# Patient Record
Sex: FEMALE | Race: WHITE | NOT HISPANIC OR LATINO
[De-identification: names, ages, dates, MRNs, and addresses within clinical notes are randomized per-mention and may not be internally consistent; named-entity substitution may affect disease eponyms.]

---

## 2018-12-27 ENCOUNTER — TRANSCRIPTION ENCOUNTER (OUTPATIENT)
Age: 28
End: 2018-12-27

## 2019-01-16 ENCOUNTER — APPOINTMENT (OUTPATIENT)
Dept: OTOLARYNGOLOGY | Facility: CLINIC | Age: 29
End: 2019-01-16
Payer: COMMERCIAL

## 2019-01-16 DIAGNOSIS — J35.01 CHRONIC TONSILLITIS: ICD-10-CM

## 2019-01-16 DIAGNOSIS — R22.1 LOCALIZED SWELLING, MASS AND LUMP, NECK: ICD-10-CM

## 2019-01-16 PROCEDURE — 99204 OFFICE O/P NEW MOD 45 MIN: CPT

## 2019-01-16 RX ORDER — BUTALBITAL, ACETAMINOPHEN, CAFFEINE, AND CODEINE PHOSPHATE 50; 300; 40; 30 MG/1; MG/1; MG/1; MG/1
CAPSULE ORAL
Refills: 0 | Status: ACTIVE | COMMUNITY

## 2019-01-16 RX ORDER — TOPIRAMATE 50 MG/1
TABLET, COATED ORAL
Refills: 0 | Status: ACTIVE | COMMUNITY

## 2019-01-16 NOTE — CONSULT LETTER
[Dear  ___] : Dear  [unfilled], [Consult Letter:] : I had the pleasure of evaluating your patient, [unfilled]. [Please see my note below.] : Please see my note below. [Consult Closing:] : Thank you very much for allowing me to participate in the care of this patient.  If you have any questions, please do not hesitate to contact me. [Sincerely,] : Sincerely, [FreeTextEntry2] : Roberto Gan MD [FreeTextEntry3] : Kristin Heredia MD\par Otolaryngology - Head & Neck Surgery\par

## 2019-01-16 NOTE — HISTORY OF PRESENT ILLNESS
[de-identified] : 28 year old patient is present today for recurrent acute tonsillitis for 10 years. Patient has h/o left tonsillectomy in 2008, when patient had it removed for a peritonsillar abscess. Since that time she has had about 1-2 throat infections over the past 10 years. Most recently, 1 month ago developed sore throat, with exudates on tonsil, went to urgent care told to go to ENT. Low grade fever at that time. Strep negative at that time. She does snore at times.

## 2019-01-16 NOTE — ASSESSMENT
[FreeTextEntry1] : - will proceed with neck us\par - Extensive discussion had with patient regarding role tonsillectomy for recurrent acute tonsillitis. Discussed risks, benefits, alternatives to treatment. She asked questions and these were answered to her apparent satisfaction. We will schedule this at our next mutual convenience. Informed written consent was obtained.\par

## 2019-01-16 NOTE — PHYSICAL EXAM
[de-identified] : right level II mobile rubbery LN, ~1.5cm [Midline] : trachea located in midline position [de-identified] : enlarged right tonsil, no exudates, left tonsil absent [Normal] : no rashes

## 2019-01-17 ENCOUNTER — FORM ENCOUNTER (OUTPATIENT)
Age: 29
End: 2019-01-17

## 2019-01-18 ENCOUNTER — OUTPATIENT (OUTPATIENT)
Dept: OUTPATIENT SERVICES | Facility: HOSPITAL | Age: 29
LOS: 1 days | Discharge: HOME | End: 2019-01-18

## 2019-01-18 DIAGNOSIS — R22.1 LOCALIZED SWELLING, MASS AND LUMP, NECK: ICD-10-CM

## 2019-01-27 ENCOUNTER — INPATIENT (INPATIENT)
Facility: HOSPITAL | Age: 29
LOS: 1 days | Discharge: HOME | End: 2019-01-29
Attending: HOSPITALIST | Admitting: HOSPITALIST
Payer: COMMERCIAL

## 2019-01-27 ENCOUNTER — TRANSCRIPTION ENCOUNTER (OUTPATIENT)
Age: 29
End: 2019-01-27

## 2019-01-27 VITALS
RESPIRATION RATE: 20 BRPM | DIASTOLIC BLOOD PRESSURE: 73 MMHG | HEART RATE: 134 BPM | SYSTOLIC BLOOD PRESSURE: 132 MMHG | TEMPERATURE: 103 F | OXYGEN SATURATION: 97 %

## 2019-01-27 LAB
ANION GAP SERPL CALC-SCNC: 18 MMOL/L — HIGH (ref 7–14)
APPEARANCE UR: CLEAR — SIGNIFICANT CHANGE UP
BASE EXCESS BLDV CALC-SCNC: 0.2 MMOL/L — SIGNIFICANT CHANGE UP (ref -2–2)
BASOPHILS # BLD AUTO: 0.05 K/UL — SIGNIFICANT CHANGE UP (ref 0–0.2)
BASOPHILS NFR BLD AUTO: 0.2 % — SIGNIFICANT CHANGE UP (ref 0–1)
BILIRUB UR-MCNC: NEGATIVE — SIGNIFICANT CHANGE UP
BUN SERPL-MCNC: 10 MG/DL — SIGNIFICANT CHANGE UP (ref 10–20)
CA-I SERPL-SCNC: 1.16 MMOL/L — SIGNIFICANT CHANGE UP (ref 1.12–1.3)
CALCIUM SERPL-MCNC: 9.1 MG/DL — SIGNIFICANT CHANGE UP (ref 8.5–10.1)
CHLORIDE SERPL-SCNC: 95 MMOL/L — LOW (ref 98–110)
CO2 SERPL-SCNC: 20 MMOL/L — SIGNIFICANT CHANGE UP (ref 17–32)
COLOR SPEC: YELLOW — SIGNIFICANT CHANGE UP
CREAT SERPL-MCNC: 1 MG/DL — SIGNIFICANT CHANGE UP (ref 0.7–1.5)
DIFF PNL FLD: ABNORMAL
EOSINOPHIL # BLD AUTO: 0 K/UL — SIGNIFICANT CHANGE UP (ref 0–0.7)
EOSINOPHIL NFR BLD AUTO: 0 % — SIGNIFICANT CHANGE UP (ref 0–8)
EPI CELLS # UR: ABNORMAL /HPF
GAS PNL BLDV: 136 MMOL/L — SIGNIFICANT CHANGE UP (ref 136–145)
GAS PNL BLDV: SIGNIFICANT CHANGE UP
GLUCOSE SERPL-MCNC: 124 MG/DL — HIGH (ref 70–99)
GLUCOSE UR QL: NEGATIVE MG/DL — SIGNIFICANT CHANGE UP
HCG SERPL QL: NEGATIVE — SIGNIFICANT CHANGE UP
HCO3 BLDV-SCNC: 24 MMOL/L — SIGNIFICANT CHANGE UP (ref 22–29)
HCT VFR BLD CALC: 37.9 % — SIGNIFICANT CHANGE UP (ref 37–47)
HCT VFR BLDA CALC: 39.6 % — SIGNIFICANT CHANGE UP (ref 34–44)
HGB BLD CALC-MCNC: 12.9 G/DL — LOW (ref 14–18)
HGB BLD-MCNC: 12.4 G/DL — SIGNIFICANT CHANGE UP (ref 12–16)
IMM GRANULOCYTES NFR BLD AUTO: 1.6 % — HIGH (ref 0.1–0.3)
KETONES UR-MCNC: ABNORMAL
LACTATE BLDV-MCNC: 0.9 MMOL/L — SIGNIFICANT CHANGE UP (ref 0.5–1.6)
LACTATE SERPL-SCNC: 1 MMOL/L — SIGNIFICANT CHANGE UP (ref 0.5–2.2)
LEUKOCYTE ESTERASE UR-ACNC: ABNORMAL
LYMPHOCYTES # BLD AUTO: 13.4 % — LOW (ref 20.5–51.1)
LYMPHOCYTES # BLD AUTO: 2.73 K/UL — SIGNIFICANT CHANGE UP (ref 1.2–3.4)
MCHC RBC-ENTMCNC: 27 PG — SIGNIFICANT CHANGE UP (ref 27–31)
MCHC RBC-ENTMCNC: 32.7 G/DL — SIGNIFICANT CHANGE UP (ref 32–37)
MCV RBC AUTO: 82.4 FL — SIGNIFICANT CHANGE UP (ref 81–99)
MONOCYTES # BLD AUTO: 1.5 K/UL — HIGH (ref 0.1–0.6)
MONOCYTES NFR BLD AUTO: 7.3 % — SIGNIFICANT CHANGE UP (ref 1.7–9.3)
NEUTROPHILS # BLD AUTO: 15.83 K/UL — HIGH (ref 1.4–6.5)
NEUTROPHILS NFR BLD AUTO: 77.5 % — HIGH (ref 42.2–75.2)
NITRITE UR-MCNC: NEGATIVE — SIGNIFICANT CHANGE UP
NRBC # BLD: 0 /100 WBCS — SIGNIFICANT CHANGE UP (ref 0–0)
PCO2 BLDV: 36 MMHG — LOW (ref 41–51)
PH BLDV: 7.43 — SIGNIFICANT CHANGE UP (ref 7.26–7.43)
PH UR: 6.5 — SIGNIFICANT CHANGE UP (ref 5–8)
PLATELET # BLD AUTO: 234 K/UL — SIGNIFICANT CHANGE UP (ref 130–400)
PO2 BLDV: 16 MMHG — LOW (ref 20–40)
POTASSIUM BLDV-SCNC: 3.4 MMOL/L — SIGNIFICANT CHANGE UP (ref 3.3–5.6)
POTASSIUM SERPL-MCNC: 3.3 MMOL/L — LOW (ref 3.5–5)
POTASSIUM SERPL-SCNC: 3.3 MMOL/L — LOW (ref 3.5–5)
PROT UR-MCNC: 30 MG/DL
RBC # BLD: 4.6 M/UL — SIGNIFICANT CHANGE UP (ref 4.2–5.4)
RBC # FLD: 15.1 % — HIGH (ref 11.5–14.5)
RBC CASTS # UR COMP ASSIST: ABNORMAL /HPF
SAO2 % BLDV: 25 % — SIGNIFICANT CHANGE UP
SODIUM SERPL-SCNC: 133 MMOL/L — LOW (ref 135–146)
SP GR SPEC: 1.01 — SIGNIFICANT CHANGE UP (ref 1.01–1.03)
TROPONIN T SERPL-MCNC: <0.01 NG/ML — SIGNIFICANT CHANGE UP
UROBILINOGEN FLD QL: 0.2 MG/DL — SIGNIFICANT CHANGE UP (ref 0.2–0.2)
WBC # BLD: 20.44 K/UL — HIGH (ref 4.8–10.8)
WBC # FLD AUTO: 20.44 K/UL — HIGH (ref 4.8–10.8)
WBC UR QL: ABNORMAL /HPF

## 2019-01-27 RX ORDER — ACETAMINOPHEN 500 MG
975 TABLET ORAL ONCE
Qty: 0 | Refills: 0 | Status: COMPLETED | OUTPATIENT
Start: 2019-01-27 | End: 2019-01-27

## 2019-01-27 RX ORDER — POTASSIUM CHLORIDE 20 MEQ
40 PACKET (EA) ORAL ONCE
Qty: 0 | Refills: 0 | Status: COMPLETED | OUTPATIENT
Start: 2019-01-27 | End: 2019-01-27

## 2019-01-27 RX ORDER — AMPICILLIN SODIUM AND SULBACTAM SODIUM 250; 125 MG/ML; MG/ML
3 INJECTION, POWDER, FOR SUSPENSION INTRAMUSCULAR; INTRAVENOUS ONCE
Qty: 0 | Refills: 0 | Status: COMPLETED | OUTPATIENT
Start: 2019-01-27 | End: 2019-01-27

## 2019-01-27 RX ORDER — SODIUM CHLORIDE 9 MG/ML
2500 INJECTION, SOLUTION INTRAVENOUS
Qty: 0 | Refills: 0 | Status: DISCONTINUED | OUTPATIENT
Start: 2019-01-27 | End: 2019-01-27

## 2019-01-27 RX ORDER — SODIUM CHLORIDE 9 MG/ML
500 INJECTION, SOLUTION INTRAVENOUS
Qty: 0 | Refills: 0 | Status: DISCONTINUED | OUTPATIENT
Start: 2019-01-27 | End: 2019-01-27

## 2019-01-27 RX ORDER — KETOROLAC TROMETHAMINE 30 MG/ML
30 SYRINGE (ML) INJECTION ONCE
Qty: 0 | Refills: 0 | Status: DISCONTINUED | OUTPATIENT
Start: 2019-01-27 | End: 2019-01-27

## 2019-01-27 RX ORDER — POTASSIUM CHLORIDE 20 MEQ
20 PACKET (EA) ORAL ONCE
Qty: 0 | Refills: 0 | Status: COMPLETED | OUTPATIENT
Start: 2019-01-27 | End: 2019-01-27

## 2019-01-27 RX ORDER — SODIUM CHLORIDE 9 MG/ML
2000 INJECTION, SOLUTION INTRAVENOUS
Qty: 0 | Refills: 0 | Status: DISCONTINUED | OUTPATIENT
Start: 2019-01-27 | End: 2019-01-27

## 2019-01-27 RX ORDER — DEXAMETHASONE 0.5 MG/5ML
10 ELIXIR ORAL ONCE
Qty: 0 | Refills: 0 | Status: COMPLETED | OUTPATIENT
Start: 2019-01-27 | End: 2019-01-27

## 2019-01-27 RX ORDER — SODIUM CHLORIDE 9 MG/ML
2500 INJECTION, SOLUTION INTRAVENOUS ONCE
Qty: 0 | Refills: 0 | Status: COMPLETED | OUTPATIENT
Start: 2019-01-27 | End: 2019-01-27

## 2019-01-27 RX ADMIN — SODIUM CHLORIDE 625 MILLILITER(S): 9 INJECTION, SOLUTION INTRAVENOUS at 18:10

## 2019-01-27 RX ADMIN — Medication 20 MILLIEQUIVALENT(S): at 19:06

## 2019-01-27 RX ADMIN — Medication 30 MILLIGRAM(S): at 18:13

## 2019-01-27 RX ADMIN — Medication 975 MILLIGRAM(S): at 19:30

## 2019-01-27 RX ADMIN — AMPICILLIN SODIUM AND SULBACTAM SODIUM 200 GRAM(S): 250; 125 INJECTION, POWDER, FOR SUSPENSION INTRAMUSCULAR; INTRAVENOUS at 19:50

## 2019-01-27 RX ADMIN — Medication 40 MILLIEQUIVALENT(S): at 21:01

## 2019-01-27 RX ADMIN — SODIUM CHLORIDE 1000 MILLILITER(S): 9 INJECTION, SOLUTION INTRAVENOUS at 18:14

## 2019-01-27 RX ADMIN — Medication 10 MILLIGRAM(S): at 18:13

## 2019-01-27 RX ADMIN — SODIUM CHLORIDE 1000 MILLILITER(S): 9 INJECTION, SOLUTION INTRAVENOUS at 18:09

## 2019-01-27 NOTE — ED PROVIDER NOTE - OBJECTIVE STATEMENT
Pt is a 29y/o female with a pmhx of tonsilitis s/p removal of left tonsil 10 years ago presents today for right tonsillar enlargement/sore throat x 3 days with associated fever, chills. Pt has scheduled tonsillectomy with Dr. Heredia on 2/12/19. Pt denies abd pain, CP, SOB, Abd pain, n/v/d.

## 2019-01-27 NOTE — ED PROVIDER NOTE - ATTENDING CONTRIBUTION TO CARE
28 y F PMH prior left tonsillectomy s/p abscess, pw right tonsillar enlargement, pain, fevers, and rapid heartbeat, with voice change. Dr. Heredia is ENT surgeon who planned to perform tonsillectomy in Feb. No n, v, d, SOB, dysuria, hematuria.   Exam: NAD, NCAT, HEENT: right tonsillar and peritonsillar enlargement, deviation of uvula, and narrowing upper airway to 2cm across. tolerating secretions, mmm, no other intraoral structure swelling. EOMI, PERRLA, Neck: right sub-mandible angle tendernes. otherwise ssupple, nontender, nl ROM, meningismus, Heart: tachycardic, RR, no murmur, Lungs: BCTA, no signs of increased WOB, Abd: NTND, no guarding or rebound, no hernia palpated, no CVAT. MSK: chest, back, and ext nontender, nl rom, no deformity. Neuro: A&Ox3, CN II-XII intact, normal strength 5/5 all 4 ext, nl sensation throughout, normal speech, gait, and coordination.   A/P: concern for pharyngitis vs peritonsillar or other upper airway abscess. Contact ENT. CT scan. Tx abx. Reassess.

## 2019-01-27 NOTE — ED PROVIDER NOTE - NS ED ROS FT
Eyes:  No visual changes, eye pain or discharge.  ENMT:  + sore throat, No hearing changes  Cardiac:  No chest pain, SOB or edema. No chest pain with exertion.  Respiratory:  No cough or respiratory distress. No hemoptysis. No history of asthma or RAD.  GI:  No nausea, vomiting, diarrhea or abdominal pain.  :  No dysuria, frequency or burning.  MS:  No myalgia, muscle weakness, joint pain or back pain.  Neuro:  No headache or weakness.  No LOC.  Skin:  No skin rash.   Endocrine: No history of thyroid disease or diabetes.  Except as documented in the HPI,  all other systems are negative.

## 2019-01-27 NOTE — ED ADULT NURSE NOTE - OBJECTIVE STATEMENT
Pt c/o right throat pain, supposed to get tonsil removed in February but having reoccurring pain and fevers.

## 2019-01-27 NOTE — ED PROVIDER NOTE - MEDICAL DECISION MAKING DETAILS
28 female here for evaluation of tonsillitis, known to Dr. Heredia for prior similar. Had labs imaging and ENT consult, plan is for admission for IV antibiotics and treatment of sepsis.  Will admit to med/surg unit.

## 2019-01-27 NOTE — ED PROVIDER NOTE - PROGRESS NOTE DETAILS
pt meets sepsis criteria, code sepsis criteria met, triage nurse made aware, appropriate fluid bolus ordered (2500cc LR), Cultures/labs/VBG Drawn, source seems to be tonsillitis vs PTA, will obtain CT iV contrast neck, muffled voice but mainting airway and in no distress ENT Notified ENT atlas recommends admission, discussed with Pt. will admit to dr. chapa's service. ENT atlas recommends admission, discussed with Pt. discussed with Dr. Jaramillo, accepts pt.

## 2019-01-27 NOTE — ED PROVIDER NOTE - PHYSICAL EXAMINATION
VITAL SIGNS: I have reviewed nursing notes and confirm.  CONSTITUTIONAL: Well-developed; well-nourished; in no acute distress.   SKIN: skin exam is warm and dry, no acute rash.    HEAD: Normocephalic; atraumatic.  EYES: conjunctiva and sclera clear.  ENT: uvula deviated to left, enlarged left tonsil vs PTA, Airway patent, no sublingual edema, No nasal discharge; airway clear.  NECK: Supple; non tender.  CARD: S1, S2 normal; no murmurs, gallops, or rubs. Regular rate and rhythm.   RESP: No wheezes, rales or rhonchi.  ABD: Normal bowel sounds; soft; non-distended; non-tender;  EXT: Normal ROM.  No clubbing, cyanosis or edema.   LYMPH: anterior tender cervical lymph adenopathy  NEURO: Alert, oriented, grossly unremarkable

## 2019-01-27 NOTE — ED ADULT NURSE NOTE - NSIMPLEMENTINTERV_GEN_ALL_ED
Implemented All Universal Safety Interventions:  [place] to call system. Call bell, personal items and telephone within reach. Instruct patient to call for assistance. Room bathroom lighting operational. Non-slip footwear when patient is off stretcher. Physically safe environment: no spills, clutter or unnecessary equipment. Stretcher in lowest position, wheels locked, appropriate side rails in place.

## 2019-01-28 DIAGNOSIS — J03.90 ACUTE TONSILLITIS, UNSPECIFIED: ICD-10-CM

## 2019-01-28 LAB
ANION GAP SERPL CALC-SCNC: 14 MMOL/L — SIGNIFICANT CHANGE UP (ref 7–14)
BASOPHILS # BLD AUTO: 0.03 K/UL — SIGNIFICANT CHANGE UP (ref 0–0.2)
BASOPHILS NFR BLD AUTO: 0.1 % — SIGNIFICANT CHANGE UP (ref 0–1)
BUN SERPL-MCNC: 11 MG/DL — SIGNIFICANT CHANGE UP (ref 10–20)
CALCIUM SERPL-MCNC: 8.7 MG/DL — SIGNIFICANT CHANGE UP (ref 8.5–10.1)
CHLORIDE SERPL-SCNC: 101 MMOL/L — SIGNIFICANT CHANGE UP (ref 98–110)
CO2 SERPL-SCNC: 22 MMOL/L — SIGNIFICANT CHANGE UP (ref 17–32)
CREAT SERPL-MCNC: 0.8 MG/DL — SIGNIFICANT CHANGE UP (ref 0.7–1.5)
CULTURE RESULTS: NO GROWTH — SIGNIFICANT CHANGE UP
EOSINOPHIL # BLD AUTO: 0 K/UL — SIGNIFICANT CHANGE UP (ref 0–0.7)
EOSINOPHIL NFR BLD AUTO: 0 % — SIGNIFICANT CHANGE UP (ref 0–8)
GLUCOSE SERPL-MCNC: 127 MG/DL — HIGH (ref 70–99)
HCT VFR BLD CALC: 33.6 % — LOW (ref 37–47)
HGB BLD-MCNC: 10.6 G/DL — LOW (ref 12–16)
IMM GRANULOCYTES NFR BLD AUTO: 0.9 % — HIGH (ref 0.1–0.3)
LYMPHOCYTES # BLD AUTO: 2.07 K/UL — SIGNIFICANT CHANGE UP (ref 1.2–3.4)
LYMPHOCYTES # BLD AUTO: 9.7 % — LOW (ref 20.5–51.1)
MAGNESIUM SERPL-MCNC: 1.9 MG/DL — SIGNIFICANT CHANGE UP (ref 1.8–2.4)
MCHC RBC-ENTMCNC: 26.5 PG — LOW (ref 27–31)
MCHC RBC-ENTMCNC: 31.5 G/DL — LOW (ref 32–37)
MCV RBC AUTO: 84 FL — SIGNIFICANT CHANGE UP (ref 81–99)
MONOCYTES # BLD AUTO: 2.14 K/UL — HIGH (ref 0.1–0.6)
MONOCYTES NFR BLD AUTO: 10.1 % — HIGH (ref 1.7–9.3)
NEUTROPHILS # BLD AUTO: 16.82 K/UL — HIGH (ref 1.4–6.5)
NEUTROPHILS NFR BLD AUTO: 79.2 % — HIGH (ref 42.2–75.2)
NRBC # BLD: 0 /100 WBCS — SIGNIFICANT CHANGE UP (ref 0–0)
PHOSPHATE SERPL-MCNC: 2 MG/DL — LOW (ref 2.1–4.9)
PLATELET # BLD AUTO: 202 K/UL — SIGNIFICANT CHANGE UP (ref 130–400)
POTASSIUM SERPL-MCNC: 4.6 MMOL/L — SIGNIFICANT CHANGE UP (ref 3.5–5)
POTASSIUM SERPL-SCNC: 4.6 MMOL/L — SIGNIFICANT CHANGE UP (ref 3.5–5)
RBC # BLD: 4 M/UL — LOW (ref 4.2–5.4)
RBC # FLD: 15.2 % — HIGH (ref 11.5–14.5)
SODIUM SERPL-SCNC: 137 MMOL/L — SIGNIFICANT CHANGE UP (ref 135–146)
SPECIMEN SOURCE: SIGNIFICANT CHANGE UP
WBC # BLD: 21.26 K/UL — HIGH (ref 4.8–10.8)
WBC # FLD AUTO: 21.26 K/UL — HIGH (ref 4.8–10.8)

## 2019-01-28 PROCEDURE — 99283 EMERGENCY DEPT VISIT LOW MDM: CPT

## 2019-01-28 RX ORDER — ENOXAPARIN SODIUM 100 MG/ML
40 INJECTION SUBCUTANEOUS DAILY
Qty: 0 | Refills: 0 | Status: DISCONTINUED | OUTPATIENT
Start: 2019-01-28 | End: 2019-01-29

## 2019-01-28 RX ORDER — ACETAMINOPHEN 500 MG
650 TABLET ORAL EVERY 6 HOURS
Qty: 0 | Refills: 0 | Status: DISCONTINUED | OUTPATIENT
Start: 2019-01-28 | End: 2019-01-29

## 2019-01-28 RX ORDER — SODIUM,POTASSIUM PHOSPHATES 278-250MG
1 POWDER IN PACKET (EA) ORAL
Qty: 0 | Refills: 0 | Status: DISCONTINUED | OUTPATIENT
Start: 2019-01-28 | End: 2019-01-29

## 2019-01-28 RX ORDER — DEXAMETHASONE 0.5 MG/5ML
4 ELIXIR ORAL EVERY 6 HOURS
Qty: 0 | Refills: 0 | Status: DISCONTINUED | OUTPATIENT
Start: 2019-01-28 | End: 2019-01-29

## 2019-01-28 RX ORDER — SODIUM,POTASSIUM PHOSPHATES 278-250MG
1 POWDER IN PACKET (EA) ORAL
Qty: 0 | Refills: 0 | Status: DISCONTINUED | OUTPATIENT
Start: 2019-01-28 | End: 2019-01-28

## 2019-01-28 RX ORDER — DEXAMETHASONE 0.5 MG/5ML
6 ELIXIR ORAL DAILY
Qty: 0 | Refills: 0 | Status: DISCONTINUED | OUTPATIENT
Start: 2019-01-28 | End: 2019-01-28

## 2019-01-28 RX ADMIN — Medication 100 MILLIGRAM(S): at 06:01

## 2019-01-28 RX ADMIN — Medication 4 MILLIGRAM(S): at 11:11

## 2019-01-28 RX ADMIN — Medication 100 MILLIGRAM(S): at 17:14

## 2019-01-28 RX ADMIN — Medication 100 MILLIGRAM(S): at 00:24

## 2019-01-28 RX ADMIN — Medication 4 MILLIGRAM(S): at 17:14

## 2019-01-28 RX ADMIN — Medication 6 MILLIGRAM(S): at 06:01

## 2019-01-28 NOTE — PROGRESS NOTE ADULT - ASSESSMENT
28 y.o female with right tonsillitis- clinically improving    ·	increased leukocytosis likely secondary to steroid use  ·	continue IV abx, consider PO trial of clinda prior to D/C  ·	can give one more dose of decadron and then D/C  ·	pain control  ·	soft/liquid diet as tolerated  ·	w/d with attng, will follow

## 2019-01-28 NOTE — CONSULT NOTE ADULT - SUBJECTIVE AND OBJECTIVE BOX
27 y/o female with no pmh presents to the ER for evaluation of fever recorded at home of 105 and throat pain and swelling.  The patient explains that 3 days ago she began to have some throat swelling and pain.  She has had this occur before in the past and had her left tonsil removed due to a similar presentation when she was 18. Patient reports that at home she was taking motrin and it was helping with the pain however the fever would remain elevated despite treatment.  She had an appointment to see  on 2/12 to have her other tonsil removed.  She reports that the reason she only had one tonsil removed is that she was told it would be beneficial to leave it in.  No reports of chills, no sob, no abd pain, no chest pain, no dizziness, no lightheadedness reported by the patient .  No sick contacts, however patient is an employee at the [employer]. She has had dysphagia over the last few days and describeds decreased oral intake 2/2 pain.   To note patient received augmentin for this current episode but did not take the medication as prescribed because she came to the ED before starting treatment.        PAST MEDICAL & SURGICAL HISTORY:  Left sided tonsillectomy  Recurrent tonsillitis      Allergies    No Known Allergies      Social History: ????    ROS:, GI, , CV, Pulm, Neuro, Psych, MS, Heme, Endo, Constitional; all negative except as noted in HPI    Vital Signs Last 24 Hrs  T(C): 36.7 (27 Jan 2019 22:32), Max: 39.5 (27 Jan 2019 17:30)  T(F): 98 (27 Jan 2019 22:32), Max: 103.1 (27 Jan 2019 17:30)  HR: 84 (27 Jan 2019 22:32) (84 - 134)  BP: 114/59 (27 Jan 2019 22:32) (114/59 - 132/73)  BP(mean): --  RR: 18 (27 Jan 2019 22:32) (18 - 20)  SpO2: 98% (27 Jan 2019 22:32) (97% - 98%)                          12.4   20.44 )-----------( 234      ( 27 Jan 2019 18:00 )             37.9    01-27    133<L>  |  95<L>  |  10  ----------------------------<  124<H>  3.3<L>   |  20  |  1.0    Ca    9.1      27 Jan 2019 18:00         PHYSICAL EXAM:  Gen: NAD, well-developed  Head: Normocephalic, Atraumatic  Face: no edema/erythema/fluctuance, parotid glands soft without mass  Eyes: PERRL, EOMI, no scleral injection  Ears: Right - ear canal clear, TM intact without effusion            Left - ear canal clear, TM intact without effusion  Nose: Nares bilaterally patent, no discharge  Mouth: Mucosa moist, Right tonsillar enlargement with exudates, Left tonsillar fossa empty  Neck: Flat, supple, + right submandibular lymphadenopathy, trachea midline, no masses  Resp: breathing easily, no stridor  CV: no peripheral edema/cyanosis    < from: CT Neck Soft Tissue w/ IV Cont (01.27.19 @ 20:56) >  Impression:    1.  Marked enlargement of the right tonsil, obliterating the   glossopharyngeal sulcus. Patent but narrowed airway (about 1 cm in width)     2.  Nonspecific appearance, but given the clinical history, the findings   likely reflect tonsillitis    3.  No abscess or fluid collection    4.  Reactive right level 2 upper cervical lymphadenopathy      < end of copied text >

## 2019-01-28 NOTE — H&P ADULT - ASSESSMENT
29 y/o female with no pmh presents to the ER for evaluation of fever recorded at home of 105 and throat pain and swelling.  The patient explains that 3 days ago she began to have some throat swelling and pain.    # Fevers, throat pain and swelling 2/2 recurrent acute bacterial tonsilitis   - wbc elevated @ [id number]  - f/u blood cultures   - 27 y/o female with no pmh presents to the ER for evaluation of fever recorded at home of 105 and throat pain and swelling.  The patient explains that 3 days ago she began to have some throat swelling and pain.    # Fevers, throat pain and swelling 2/2 recurrent acute bacterial tonsilitis   - wbc elevated @ [id number]  - CT neck shows Marked enlargement of the right tonsil, obliterating the glossopharyngeal sulcus. Patent but narrowed airway (about 1 cm in width)   - f/u blood cultures   - will c/w clindamycin 600mg IV q8hr  - pt received decadron 10mg IV, does not appear to be in respiratory distress, will c/w decadron 6mg daily  - as per nocturinist  no intensive care needed currently as patient is stable with no distress as mentioned  - tylenol for fevers  - ENT evaluation placed     Activity: ambulate as tolerated  diet: regular  gi ppx: not indicated  dvt ppx: lovenox 40mg daily  full code  dispo: from home

## 2019-01-28 NOTE — CONSULT NOTE ADULT - PROBLEM SELECTOR RECOMMENDATION 9
Decadron 10 mg x 1 then 4 mg q 6  IV hydration  Toradol for pain  Throat cultures/ID evaluation  Empiric Abx such as Unasyn  Will f/u in AM

## 2019-01-28 NOTE — CONSULT NOTE ADULT - ATTENDING COMMENTS
Treat with 1 week abx. Discussed with patient that her surgery may need to be delayed, currently scheduled for 2/12. Pre-op testing scheduled for tomorrow. May need to reschedule if still in ED/inpatient tomorrow   - ok for d/c from ENT standpoint as long as tolerating PO, able to maintain hydration and nutrition

## 2019-01-28 NOTE — PROGRESS NOTE ADULT - SUBJECTIVE AND OBJECTIVE BOX
Pt is a 28 y.o female admitted with right tonsillitis - pt seen and examined at bedside. PT states she is doing well, able to tolerate liquids and soft PO (pudding/soup), not regular PO yet. Pt still c/o throat pain, no significant improvement from yesterday. Pt denies any SOB/diff breathing.    Vital Signs: T(F): 96.9 (28 Jan 2019 07:36), Max: 103.1 (27 Jan 2019 17:30), HR: 70, BP: 101/63, RR: 18, SpO2: 100%  GEN: NAD, awake and alert. lying in bed comfortably, no drooling or pooling of secretions.  HEENT: NC/AT; oral cavity no erythema/edema, uvula midline, 3+ right tonsillar edema, no exudates noted. absent left tonsil. no TTP over peritonsillar space, no obvious signs of abscess. no FOMT/edema. neck supple, localized TTP over R lateral neck. FROM of neck.    MEDICATIONS (STANDING):  clindamycin IVPB 600 milliGRAM(s) IV Intermittent every 12 hours  dexamethasone Tablet 4 milliGRAM(s) Oral every 6 hours  enoxaparin Injectable 40 milliGRAM(s) SubCutaneous daily    LABS:                 10.6   21.26 )-----------( 202                 33.6     EXAM:  CT NECK SOFT TISSUE IC          PROCEDURE DATE:  01/27/2019    INTERPRETATION:  Clinical History / Reason for exam: 28-year-old female   complaining of sore throat. Past medical history of tonsillitis and   removal of left tonsil 10 years ago now with right tonsillar enlargement   and throat pain for 3 days with fever and chills  Comparison studies: None    Findings:    Upper aerodigestive tract: There is an anterior foreign body in the oral   tongue consistent with a tongue piercing. There is marked enlargement of   the right tonsil which compresses the airway and causes soft tissue of   the glossopharyngeal sulcus. The airway remains patent, measuring about 1   cm at its narrowest width. There is no fluid collection    The supraglottic, glottic and subglottic portions of the larynx are   normal.  Submandibular and parotid glands are normal. Parotid tissue extends   anteriorly along the external margins of the masseter muscles   bilaterally, normal variant. There is probably a small lipoma of the left   buccal fat pad. The thyroid gland is normal.  Lymph nodes: Level 2 lymph nodes on the right are asymmetrically   prominent, measuring up to 1.5 cm and rounded. These are likely reactive.  Vessels: The carotid arteries, carotid bifurcations and vertebral   arteries in the neck are normal. The intracranial vessels are normally   patent.  Bones: There is reversal of the normal cervical curvature. The bones are   normally mineralized.  Brain: The visualized brain parenchyma included on the study is   unremarkable.  Thorax: The upper lung fields and mediastinum are unremarkable.    Impression:    1.  Marked enlargement of the right tonsil, obliterating the   glossopharyngeal sulcus. Patent but narrowed airway (about 1 cm in width)     2.  Nonspecific appearance, but given the clinical history, the findings   likely reflect tonsillitis    3.  No abscess or fluid collection    4.  Reactive right level 2 upper cervical lymphadenopathy    ROSI HENSLEY M.D., ATTENDING RADIOLOGIST  This document has been electronically signed. Jan 27 2019  9:44PM

## 2019-01-28 NOTE — H&P ADULT - MOUTH
She was told the doctor has to request the results of her labs. She had them done at [facility] in [place].    normal mouth and gums

## 2019-01-28 NOTE — H&P ADULT - ATTENDING COMMENTS
29 y/o female with no pmh presents to the ER for evaluation of fever recorded at home of 105 and throat pain and swelling.  The patient explains that 3 days ago she began to have some throat swelling and pain.  She has had this occur before in the past and had her left tonsil removed due to a similar presentation when she was 18. Patient reports that at home she was taking motrin and it was helping with the pain however the fever would remain elevated despite treatment.  She had an appointment to see  on 2/12 to have her other tonsil removed.  She reports that the reason she only had one tonsil removed is that she was told it would be beneficial to leave it in.  No reports of chills, no sob, no abd pain, no chest pain, no dizziness, no lightheadedness reported by the patient .  No sick contacts, however patient is an employee at the [employer]. She has had dysphagia over the last few days and describeds decreased oral intake 2/2 pain.   To note patient received augmentin for this current episode but did not take the medication as prescribed because she came to the ED before starting treatment.    REVIEW OF SYSTEMS:    CONSTITUTIONAL: No weakness, fevers or chills  EYES/ENT: No visual changes;  No vertigo or throat pain   NECK: No pain or stiffness  RESPIRATORY: No cough, wheezing, hemoptysis; No shortness of breath  CARDIOVASCULAR: No chest pain or palpitations  GASTROINTESTINAL: No abdominal or epigastric pain. No nausea, vomiting, or hematemesis; No diarrhea or constipation. No melena or hematochezia.  GENITOURINARY: No dysuria, frequency or hematuria  NEUROLOGICAL: No numbness or weakness  SKIN: No itching, rashes    Physical Exam:    General: WN/WD NAD  Neurology: A&Ox3, nonfocal, follows commands  Eyes: PERRLA/ EOMI  ENT/Neck: Neck supple, trachea midline, No JVD  Respiratory: CTA B/L, No wheezing, rales, rhonchi  CV: Normal rate regular rhythm, S1S2, no murmurs, rubs or gallops  Abdominal: Soft, NT, ND +BS,   Extremities: No edema, + peripheral pulses  Skin: No Rashes, Hematoma, Ecchymosis  Incisions:   Tubes: 29 y/o female with no pmh presents to the ER for evaluation of fever recorded at home of 105 and throat pain and swelling.  The patient explains that 3 days ago she began to have some throat swelling and pain.  She has had this occur before in the past and had her left tonsil removed due to a similar presentation when she was 18. Patient reports that at home she was taking motrin and it was helping with the pain however the fever would remain elevated despite treatment.  She had an appointment to see  on 2/12 to have her other tonsil removed.  She reports that the reason she only had one tonsil removed is that she was told it would be beneficial to leave it in.  No reports of chills, no sob, no abd pain, no chest pain, no dizziness, no lightheadedness reported by the patient .  No sick contacts, however patient is an employee at the [employer]. She has had dysphagia over the last few days and describeds decreased oral intake 2/2 pain.   To note patient received augmentin for this current episode but did not take the medication as prescribed because she came to the ED before starting treatment.     REVIEW OF SYSTEMS:  CONSTITUTIONAL: No weakness, fevers or chills  EYES/ENT: No visual changes;  No vertigo or throat pain   NECK: No pain or stiffness  RESPIRATORY: No cough, wheezing, hemoptysis; No shortness of breath  CARDIOVASCULAR: No chest pain or palpitations  GASTROINTESTINAL: No abdominal or epigastric pain. No nausea, vomiting, or hematemesis; No diarrhea or constipation. No melena or hematochezia.  GENITOURINARY: No dysuria, frequency or hematuria  NEUROLOGICAL: No numbness or weakness  SKIN: No itching, rashes    T(C): 36.1 (01-28-19 @ 03:08), Max: 39.5 (01-27-19 @ 17:30)  HR: 64 (01-28-19 @ 03:08) (64 - 134)  BP: 105/58 (01-28-19 @ 03:08) (105/58 - 132/73)  RR: 18 (01-28-19 @ 03:08) (18 - 20)  SpO2: 100% (01-28-19 @ 03:08) (97% - 100%)    Physical Exam:  General: WN/WD NAD  Neurology: A&Ox3, nonfocal, follows commands  Eyes: PERRLA/ EOMI  ENT/Neck: Neck supple, trachea midline, R tonsil enlarged and crossing the midline w/ narrowed passageway,   No JVD, NO stridor, R sided neck swelling   Respiratory: CTA B/L, No wheezing, rales, rhonchi  CV: Normal rate regular rhythm, S1S2, no murmurs, rubs or gallops  Abdominal: Soft, NT, ND +BS,   Extremities: No edema, + peripheral pulses  Skin: No Rashes, Hematoma, Ecchymosis  Incisions: n/a  Tubes:n/a    A/P   Acute tonsilitis /sepsis - w/ narrowed airway but in no resp distress  -IV abx and decadron  -check blood Cx and repeat WBC  -PRN pain and fever control  -ENT eval     DVT prophylaxis

## 2019-01-29 ENCOUNTER — TRANSCRIPTION ENCOUNTER (OUTPATIENT)
Age: 29
End: 2019-01-29

## 2019-01-29 VITALS
HEART RATE: 77 BPM | SYSTOLIC BLOOD PRESSURE: 128 MMHG | TEMPERATURE: 97 F | DIASTOLIC BLOOD PRESSURE: 59 MMHG | RESPIRATION RATE: 19 BRPM

## 2019-01-29 LAB
ALBUMIN SERPL ELPH-MCNC: 3.5 G/DL — SIGNIFICANT CHANGE UP (ref 3.5–5.2)
ALP SERPL-CCNC: 58 U/L — SIGNIFICANT CHANGE UP (ref 30–115)
ALT FLD-CCNC: 13 U/L — SIGNIFICANT CHANGE UP (ref 0–41)
ANION GAP SERPL CALC-SCNC: 15 MMOL/L — HIGH (ref 7–14)
AST SERPL-CCNC: 11 U/L — SIGNIFICANT CHANGE UP (ref 0–41)
BASOPHILS # BLD AUTO: 0.03 K/UL — SIGNIFICANT CHANGE UP (ref 0–0.2)
BASOPHILS NFR BLD AUTO: 0.1 % — SIGNIFICANT CHANGE UP (ref 0–1)
BILIRUB SERPL-MCNC: <0.2 MG/DL — SIGNIFICANT CHANGE UP (ref 0.2–1.2)
BUN SERPL-MCNC: 13 MG/DL — SIGNIFICANT CHANGE UP (ref 10–20)
CALCIUM SERPL-MCNC: 9 MG/DL — SIGNIFICANT CHANGE UP (ref 8.5–10.1)
CHLORIDE SERPL-SCNC: 102 MMOL/L — SIGNIFICANT CHANGE UP (ref 98–110)
CO2 SERPL-SCNC: 23 MMOL/L — SIGNIFICANT CHANGE UP (ref 17–32)
CREAT SERPL-MCNC: 0.8 MG/DL — SIGNIFICANT CHANGE UP (ref 0.7–1.5)
EOSINOPHIL # BLD AUTO: 0 K/UL — SIGNIFICANT CHANGE UP (ref 0–0.7)
EOSINOPHIL NFR BLD AUTO: 0 % — SIGNIFICANT CHANGE UP (ref 0–8)
GLUCOSE SERPL-MCNC: 146 MG/DL — HIGH (ref 70–99)
HCT VFR BLD CALC: 34.1 % — LOW (ref 37–47)
HGB BLD-MCNC: 10.8 G/DL — LOW (ref 12–16)
IMM GRANULOCYTES NFR BLD AUTO: 1.2 % — HIGH (ref 0.1–0.3)
INR BLD: 0.99 RATIO — SIGNIFICANT CHANGE UP (ref 0.65–1.3)
LYMPHOCYTES # BLD AUTO: 2.04 K/UL — SIGNIFICANT CHANGE UP (ref 1.2–3.4)
LYMPHOCYTES # BLD AUTO: 9.4 % — LOW (ref 20.5–51.1)
MAGNESIUM SERPL-MCNC: 2.1 MG/DL — SIGNIFICANT CHANGE UP (ref 1.8–2.4)
MCHC RBC-ENTMCNC: 26.7 PG — LOW (ref 27–31)
MCHC RBC-ENTMCNC: 31.7 G/DL — LOW (ref 32–37)
MCV RBC AUTO: 84.4 FL — SIGNIFICANT CHANGE UP (ref 81–99)
MONOCYTES # BLD AUTO: 0.94 K/UL — HIGH (ref 0.1–0.6)
MONOCYTES NFR BLD AUTO: 4.3 % — SIGNIFICANT CHANGE UP (ref 1.7–9.3)
NEUTROPHILS # BLD AUTO: 18.49 K/UL — HIGH (ref 1.4–6.5)
NEUTROPHILS NFR BLD AUTO: 85 % — HIGH (ref 42.2–75.2)
PLATELET # BLD AUTO: 269 K/UL — SIGNIFICANT CHANGE UP (ref 130–400)
POTASSIUM SERPL-MCNC: 5.4 MMOL/L — HIGH (ref 3.5–5)
POTASSIUM SERPL-SCNC: 5.4 MMOL/L — HIGH (ref 3.5–5)
PROT SERPL-MCNC: 6.6 G/DL — SIGNIFICANT CHANGE UP (ref 6–8)
PROTHROM AB SERPL-ACNC: 11.4 SEC — SIGNIFICANT CHANGE UP (ref 9.95–12.87)
RBC # BLD: 4.04 M/UL — LOW (ref 4.2–5.4)
RBC # FLD: 15.5 % — HIGH (ref 11.5–14.5)
SODIUM SERPL-SCNC: 140 MMOL/L — SIGNIFICANT CHANGE UP (ref 135–146)
WBC # BLD: 21.77 K/UL — HIGH (ref 4.8–10.8)
WBC # FLD AUTO: 21.77 K/UL — HIGH (ref 4.8–10.8)

## 2019-01-29 RX ORDER — ACETAMINOPHEN 500 MG
2 TABLET ORAL
Qty: 40 | Refills: 0
Start: 2019-01-29 | End: 2019-02-02

## 2019-01-29 RX ORDER — ACETAMINOPHEN 500 MG
650 TABLET ORAL EVERY 6 HOURS
Qty: 0 | Refills: 0 | Status: DISCONTINUED | OUTPATIENT
Start: 2019-01-29 | End: 2019-01-29

## 2019-01-29 RX ORDER — LACTOBACILLUS ACIDOPHILUS 100MM CELL
1 CAPSULE ORAL
Qty: 7 | Refills: 0 | OUTPATIENT
Start: 2019-01-29 | End: 2019-02-04

## 2019-01-29 RX ADMIN — Medication 650 MILLIGRAM(S): at 07:24

## 2019-01-29 RX ADMIN — Medication 100 MILLIGRAM(S): at 05:55

## 2019-01-29 RX ADMIN — Medication 4 MILLIGRAM(S): at 05:54

## 2019-01-29 RX ADMIN — Medication 1 PACKET(S): at 07:24

## 2019-01-29 RX ADMIN — Medication 4 MILLIGRAM(S): at 01:01

## 2019-01-29 NOTE — DISCHARGE NOTE ADULT - CARE PROVIDER_API CALL
Kristin Heredia), Otolaryngology  [address] [place] [address]  Phone: [phone]  Fax: [phone]

## 2019-01-29 NOTE — PROGRESS NOTE ADULT - ASSESSMENT
Assessment: 27y/o F with R tonsillitis, clinically improving    - increased leukocytosis likely 2/2 steroid use.  - Continue Abx, switch to PO upon discharge  - d/c Decadron  - advance diet as tolerated  - will d/w attending.

## 2019-01-29 NOTE — DISCHARGE NOTE ADULT - CARE PLAN
Principal Discharge DX:	Tonsillitis  Goal:	complete resolution  Assessment and plan of treatment:	Acute tonsillitis of rt tonsil.  continue with oral medicine  follow up with ENT on monday as outpatient  tylenol for pain.

## 2019-01-29 NOTE — PROGRESS NOTE ADULT - SUBJECTIVE AND OBJECTIVE BOX
SUBJECTIVE:    Patient is a 28y old Female who presents with a chief complaint of Fever and throat swelling (2019 08:12)    Currently admitted to medicine with the primary diagnosis of Tonsillitis     Today is hospital day 2d.   This morning she is resting comfortably in bed and reports no new issues or overnight events.     PAST MEDICAL & SURGICAL HISTORY    ALLERGIES:  No Known Allergies    MEDICATIONS:  STANDING MEDICATIONS  clindamycin IVPB 600 milliGRAM(s) IV Intermittent every 12 hours  dexamethasone     Tablet 4 milliGRAM(s) Oral every 6 hours  enoxaparin Injectable 40 milliGRAM(s) SubCutaneous daily  potassium phosphate / sodium phosphate powder 1 Packet(s) Oral two times a day with meals    PRN MEDICATIONS  acetaminophen   Tablet .. 650 milliGRAM(s) Oral every 6 hours PRN  acetaminophen   Tablet .. 650 milliGRAM(s) Oral every 6 hours PRN    VITALS:   T(F): 96.1  HR: 84  BP: 119/71  RR: 18  SpO2: 100%    LABS:                        10.8   21.77 )-----------( 269      ( 2019 05:54 )             34.1         140  |  102  |  13  ----------------------------<  146<H>  5.4<H>   |  23  |  0.8    Ca    9.0      2019 05:54  Phos  2.0       Mg     2.1         TPro  6.6  /  Alb  3.5  /  TBili  <0.2  /  DBili  x   /  AST  11  /  ALT  13  /  AlkPhos  58      PT/INR - ( 2019 05:54 )   PT: 11.40 sec;   INR: 0.99 ratio           Urinalysis Basic - ( 2019 19:20 )    Color: Yellow / Appearance: Clear / S.015 / pH: x  Gluc: x / Ketone: Trace  / Bili: Negative / Urobili: 0.2 mg/dL   Blood: x / Protein: 30 mg/dL / Nitrite: Negative   Leuk Esterase: Trace / RBC: 3-5 /HPF / WBC 6-10 /HPF   Sq Epi: x / Non Sq Epi: Few /HPF / Bacteria: x    Culture - Urine (collected 2019 19:20)  Source: .Urine Clean Catch (Midstream)  Final Report (2019 22:26):    No growth    Culture - Blood (collected 2019 18:05)  Source: .Blood Blood  Preliminary Report (2019 02:19):    No growth to date.    Culture - Blood (collected 2019 18:05)  Source: .Blood Blood  Preliminary Report (2019 02:19):    No growth to date.      CARDIAC MARKERS ( 2019 18:39 )  x     / <0.01 ng/mL / x     / x     / x        PHYSICAL EXAM:  GEN: No acute distress  LUNGS: Clear to auscultation bilaterally   HEART: Regular  ABD: Soft, non-tender, non-distended.  EXT: NC/NC/NE/2+PP/HADLEY/Skin Intact.   NEURO: AAOX3 SUBJECTIVE:    Patient is a 28y old Female who presents with a chief complaint of Fever and throat swelling (2019 08:12)    Currently admitted to medicine with the primary diagnosis of Tonsillitis     Today is hospital day 2d.   This morning she is resting comfortably in bed and reports no new issues or overnight events.   pain and swallowing improving    PAST MEDICAL & SURGICAL HISTORY    ALLERGIES:  No Known Allergies    MEDICATIONS:  STANDING MEDICATIONS  clindamycin IVPB 600 milliGRAM(s) IV Intermittent every 12 hours  dexamethasone     Tablet 4 milliGRAM(s) Oral every 6 hours  enoxaparin Injectable 40 milliGRAM(s) SubCutaneous daily  potassium phosphate / sodium phosphate powder 1 Packet(s) Oral two times a day with meals    PRN MEDICATIONS  acetaminophen   Tablet .. 650 milliGRAM(s) Oral every 6 hours PRN  acetaminophen   Tablet .. 650 milliGRAM(s) Oral every 6 hours PRN    VITALS:   T(F): 96.1  HR: 84  BP: 119/71  RR: 18  SpO2: 100%    LABS:                        10.8   21.77 )-----------( 269      ( 2019 05:54 )             34.1         140  |  102  |  13  ----------------------------<  146<H>  5.4<H>   |  23  |  0.8    Ca    9.0      2019 05:54  Phos  2.0       Mg     2.1         TPro  6.6  /  Alb  3.5  /  TBili  <0.2  /  DBili  x   /  AST  11  /  ALT  13  /  AlkPhos  58      PT/INR - ( 2019 05:54 )   PT: 11.40 sec;   INR: 0.99 ratio      Urinalysis Basic - ( 2019 19:20 )    Color: Yellow / Appearance: Clear / S.015 / pH: x  Gluc: x / Ketone: Trace  / Bili: Negative / Urobili: 0.2 mg/dL   Blood: x / Protein: 30 mg/dL / Nitrite: Negative   Leuk Esterase: Trace / RBC: 3-5 /HPF / WBC 6-10 /HPF   Sq Epi: x / Non Sq Epi: Few /HPF / Bacteria: x    Culture - Urine (collected 2019 19:20)  Source: .Urine Clean Catch (Midstream)  Final Report (2019 22:26):    No growth    Culture - Blood (collected 2019 18:05)  Source: .Blood Blood  Preliminary Report (2019 02:19):    No growth to date.    Culture - Blood (collected 2019 18:05)  Source: .Blood Blood  Preliminary Report (2019 02:19):    No growth to date.      CARDIAC MARKERS ( 2019 18:39 )  x     / <0.01 ng/mL / x     / x     / x        PHYSICAL EXAM:  GEN: No acute distress, red swollen rt tonsil with pus point.  LUNGS: Clear to auscultation bilaterally   HEART: Regular  ABD: Soft, non-tender, non-distended.  EXT: NC/NC/NE/2+PP/HADLEY/Skin Intact.   NEURO: AAOX3

## 2019-01-29 NOTE — DISCHARGE NOTE ADULT - PATIENT PORTAL LINK FT
You can access the [facility] Patient Portal, offered by [facility], by registering with the following website: [email or web address]

## 2019-01-29 NOTE — PROGRESS NOTE ADULT - ATTENDING COMMENTS
Patient seen and examined independently. I agree with the resident's note, physical exam, and plan except as below.  Vital Signs Last 24 Hrs  T(C): 35.6 (29 Jan 2019 07:27), Max: 36.4 (28 Jan 2019 20:30)  T(F): 96.1 (29 Jan 2019 07:27), Max: 97.6 (28 Jan 2019 23:00)  HR: 84 (29 Jan 2019 07:27) (80 - 95)  BP: 119/71 (29 Jan 2019 07:27) (117/66 - 135/74)  BP(mean): 97 (28 Jan 2019 20:30) (97 - 97)  RR: 18 (29 Jan 2019 07:27) (18 - 18)  SpO2: 100% (28 Jan 2019 20:30) (100% - 100%)  PE  nad  [id number]  ctabl  soft ntnd+bs  no cee  Right tonsillar enlargement , no exudates  no stridor      #sepsis due to acute right tonsilitis (left tonsil removed in teenage years)  on Iv clinda  advance diet - pt has not eaten yet  no ENT intervention at this time - no abscess  outpt surgery for be rescheduled   if tolerated diet can dc home with oral clinda 300mg q8 for 7 days + lacobascillus probiotics  dc dexamethasone  discussed with resident

## 2019-01-29 NOTE — PROGRESS NOTE ADULT - ASSESSMENT
27 y/o female with no pmh presents to the ER for evaluation of fever recorded at home of 105 and throat pain and swelling.  The patient explains that 3 days ago she began to have some throat swelling and pain.    # Fevers, throat pain and swelling 2/2 recurrent acute bacterial tonsilitis   - wbc elevated @ 96610  - CT neck shows Marked enlargement of the right tonsil, obliterating the glossopharyngeal sulcus. Patent but narrowed airway (about 1 cm in width)   - f/u blood cultures   - will c/w clindamycin 600mg IV q8hr  - dc decadron as per ENT  - tylenol for fevers    Activity: ambulate as tolerated  diet: regular  gi ppx: not indicated  dvt ppx: lovenox 40mg daily  full code  dispo: from home 27 y/o female with no pmh presents to the ER for evaluation of fever recorded at home of 105 and throat pain and swelling.  The patient explains that 3 days ago she began to have some throat swelling and pain.    # Fevers, throat pain and swelling 2/2 recurrent acute bacterial tonsilitis   - pain and swallowing better  - ENT on board  - wbc elevated @ 38571  - CT neck shows Marked enlargement of the right tonsil, obliterating the glossopharyngeal sulcus. Patent but narrowed airway (about 1 cm in width)   - blood cultures no growth   - will c/w clindamycin 600mg IV q8hr  - dc decadron as per ENT  - tylenol for fevers    Activity: ambulate as tolerated  diet: regular  gi ppx: not indicated  dvt ppx: lovenox 40mg daily  full code  dispo: from home

## 2019-01-29 NOTE — PROGRESS NOTE ADULT - SUBJECTIVE AND OBJECTIVE BOX
Pt is a 27y/o F admitted with right tonsillitis. Seen and examined at bedside. Pt states that she is feeling much better from yesterday, no acute events overnight. Pt states diet still soft/ liquid, has not advanced due to continued tonsillar pain.     Vital Signs Last 24 Hrs  T(F): 96.1, Max: 97.6 HR: 84 BP: 119/71 RR: 18 SpO2: 100%    Gen: NAD, awake, alert, sitting in bed comfortably. No drooling or pooling of secretions.  HEENT: NC/AT. Oral cavity no erythema or edema, uvula midline, 2+ R tonsillar edema improved from previous examination, no exudates noted, absent L tonsil, FROM of neck.     MEDICATIONS  (STANDING):  clindamycin IVPB 600 milliGRAM(s) IV Intermittent every 12 hours  dexamethasone     Tablet 4 milliGRAM(s) Oral every 6 hours  enoxaparin Injectable 40 milliGRAM(s) SubCutaneous daily  potassium phosphate / sodium phosphate powder 1 Packet(s) Oral two times a day with meals                          10.8   21.77 )-----------( 269                  34.1

## 2019-01-29 NOTE — DISCHARGE NOTE ADULT - PLAN OF CARE
complete resolution Acute tonsillitis of rt tonsil.  continue with oral medicine  follow up with ENT on monday as outpatient  tylenol for pain.

## 2019-01-29 NOTE — DISCHARGE NOTE ADULT - CARE PROVIDERS DIRECT ADDRESSES
[email or web address]

## 2019-01-29 NOTE — DISCHARGE NOTE ADULT - MEDICATION SUMMARY - MEDICATIONS TO TAKE
I will START or STAY ON the medications listed below when I get home from the hospital:    Tylenol 325 mg oral tablet  -- 2 tab(s) by mouth 4 times a day as needed  -- This product contains acetaminophen.  Do not use  with any other product containing acetaminophen to prevent possible liver damage.    -- Indication: For pain    Cleocin HCl 300 mg oral capsule  -- 1 cap(s) by mouth 3 times a day   -- Finish all this medication unless otherwise directed by prescriber.  Medication should be taken with plenty of water.    -- Indication: For Tonsillitis    lactobacillus acidophilus oral capsule  -- 1 cap(s) by mouth once a day   -- Indication: For probiotics

## 2019-01-29 NOTE — DISCHARGE NOTE ADULT - HOSPITAL COURSE
29 y/o female with no pmh presents to the ER for evaluation of fever recorded at home of 105 and throat pain and swelling.  The patient explains that 3 days ago she began to have some throat swelling and pain.    patient was admitted under medicine, started on iv clindamicin and decadron. seen by ENT, recs hold decadron.  patient is improving, will discharge on oral meds.

## 2019-02-01 ENCOUNTER — OTHER (OUTPATIENT)
Age: 29
End: 2019-02-01

## 2019-02-01 DIAGNOSIS — A41.9 SEPSIS, UNSPECIFIED ORGANISM: ICD-10-CM

## 2019-02-01 DIAGNOSIS — J02.9 ACUTE PHARYNGITIS, UNSPECIFIED: ICD-10-CM

## 2019-02-01 DIAGNOSIS — J03.80 ACUTE TONSILLITIS DUE TO OTHER SPECIFIED ORGANISMS: ICD-10-CM

## 2019-02-02 LAB
CULTURE RESULTS: SIGNIFICANT CHANGE UP
CULTURE RESULTS: SIGNIFICANT CHANGE UP
SPECIMEN SOURCE: SIGNIFICANT CHANGE UP
SPECIMEN SOURCE: SIGNIFICANT CHANGE UP

## 2019-02-04 ENCOUNTER — OUTPATIENT (OUTPATIENT)
Dept: OUTPATIENT SERVICES | Facility: HOSPITAL | Age: 29
LOS: 1 days | Discharge: HOME | End: 2019-02-04

## 2019-02-04 VITALS
RESPIRATION RATE: 18 BRPM | HEART RATE: 72 BPM | HEIGHT: 66 IN | OXYGEN SATURATION: 98 % | DIASTOLIC BLOOD PRESSURE: 66 MMHG | SYSTOLIC BLOOD PRESSURE: 122 MMHG | WEIGHT: 176.37 LBS | TEMPERATURE: 98 F

## 2019-02-04 DIAGNOSIS — J35.01 CHRONIC TONSILLITIS: ICD-10-CM

## 2019-02-04 DIAGNOSIS — Z01.818 ENCOUNTER FOR OTHER PREPROCEDURAL EXAMINATION: ICD-10-CM

## 2019-02-04 DIAGNOSIS — Z90.89 ACQUIRED ABSENCE OF OTHER ORGANS: Chronic | ICD-10-CM

## 2019-02-04 LAB
ALBUMIN SERPL ELPH-MCNC: 3.9 G/DL — SIGNIFICANT CHANGE UP (ref 3.5–5.2)
ALP SERPL-CCNC: 48 U/L — SIGNIFICANT CHANGE UP (ref 30–115)
ALT FLD-CCNC: 12 U/L — SIGNIFICANT CHANGE UP (ref 0–41)
ANION GAP SERPL CALC-SCNC: 16 MMOL/L — HIGH (ref 7–14)
APTT BLD: 30.5 SEC — SIGNIFICANT CHANGE UP (ref 27–39.2)
AST SERPL-CCNC: 13 U/L — SIGNIFICANT CHANGE UP (ref 0–41)
BASOPHILS # BLD AUTO: 0.03 K/UL — SIGNIFICANT CHANGE UP (ref 0–0.2)
BASOPHILS NFR BLD AUTO: 0.3 % — SIGNIFICANT CHANGE UP (ref 0–1)
BILIRUB SERPL-MCNC: <0.2 MG/DL — SIGNIFICANT CHANGE UP (ref 0.2–1.2)
BUN SERPL-MCNC: 15 MG/DL — SIGNIFICANT CHANGE UP (ref 10–20)
CALCIUM SERPL-MCNC: 9.1 MG/DL — SIGNIFICANT CHANGE UP (ref 8.5–10.1)
CHLORIDE SERPL-SCNC: 100 MMOL/L — SIGNIFICANT CHANGE UP (ref 98–110)
CO2 SERPL-SCNC: 24 MMOL/L — SIGNIFICANT CHANGE UP (ref 17–32)
CREAT SERPL-MCNC: 0.9 MG/DL — SIGNIFICANT CHANGE UP (ref 0.7–1.5)
EOSINOPHIL # BLD AUTO: 0.06 K/UL — SIGNIFICANT CHANGE UP (ref 0–0.7)
EOSINOPHIL NFR BLD AUTO: 0.5 % — SIGNIFICANT CHANGE UP (ref 0–8)
GLUCOSE SERPL-MCNC: 77 MG/DL — SIGNIFICANT CHANGE UP (ref 70–99)
HCT VFR BLD CALC: 36.5 % — LOW (ref 37–47)
HGB BLD-MCNC: 11.8 G/DL — LOW (ref 12–16)
IMM GRANULOCYTES NFR BLD AUTO: 1.7 % — HIGH (ref 0.1–0.3)
INR BLD: 0.94 RATIO — SIGNIFICANT CHANGE UP (ref 0.65–1.3)
LYMPHOCYTES # BLD AUTO: 2.97 K/UL — SIGNIFICANT CHANGE UP (ref 1.2–3.4)
LYMPHOCYTES # BLD AUTO: 27.2 % — SIGNIFICANT CHANGE UP (ref 20.5–51.1)
MCHC RBC-ENTMCNC: 26.8 PG — LOW (ref 27–31)
MCHC RBC-ENTMCNC: 32.3 G/DL — SIGNIFICANT CHANGE UP (ref 32–37)
MCV RBC AUTO: 83 FL — SIGNIFICANT CHANGE UP (ref 81–99)
MONOCYTES # BLD AUTO: 0.7 K/UL — HIGH (ref 0.1–0.6)
MONOCYTES NFR BLD AUTO: 6.4 % — SIGNIFICANT CHANGE UP (ref 1.7–9.3)
NEUTROPHILS # BLD AUTO: 6.96 K/UL — HIGH (ref 1.4–6.5)
NEUTROPHILS NFR BLD AUTO: 63.9 % — SIGNIFICANT CHANGE UP (ref 42.2–75.2)
NRBC # BLD: 0 /100 WBCS — SIGNIFICANT CHANGE UP (ref 0–0)
PLATELET # BLD AUTO: 433 K/UL — HIGH (ref 130–400)
POTASSIUM SERPL-MCNC: 4.8 MMOL/L — SIGNIFICANT CHANGE UP (ref 3.5–5)
POTASSIUM SERPL-SCNC: 4.8 MMOL/L — SIGNIFICANT CHANGE UP (ref 3.5–5)
PROT SERPL-MCNC: 7.3 G/DL — SIGNIFICANT CHANGE UP (ref 6–8)
PROTHROM AB SERPL-ACNC: 10.8 SEC — SIGNIFICANT CHANGE UP (ref 9.95–12.87)
RBC # BLD: 4.4 M/UL — SIGNIFICANT CHANGE UP (ref 4.2–5.4)
RBC # FLD: 14.6 % — HIGH (ref 11.5–14.5)
SODIUM SERPL-SCNC: 140 MMOL/L — SIGNIFICANT CHANGE UP (ref 135–146)
WBC # BLD: 10.91 K/UL — HIGH (ref 4.8–10.8)
WBC # FLD AUTO: 10.91 K/UL — HIGH (ref 4.8–10.8)

## 2019-02-04 NOTE — H&P PST ADULT - REASON FOR ADMISSION
28 yr old f present to PAST for Right Tonsillectomy under GA on 02/12/2019 at [facility] OR by DR. Heredia.

## 2019-02-04 NOTE — H&P PST ADULT - FAMILY HISTORY
Father  Still living? Yes, Estimated age: Age Unknown  DM (diabetes mellitus), Age at diagnosis: Age Unknown     Mother  Still living? Unknown  DM (diabetes mellitus), Age at diagnosis: Age Unknown

## 2019-02-04 NOTE — H&P PST ADULT - HISTORY OF PRESENT ILLNESS
Patient presents with c/o recurrent throat infection and last infection was on 1/27/2018 and admitted to [facility] with fever of 105. Denies any c/o cp, sob, palpittaions, fever, cough or dysuria. Ex tolerance of 10 fos walk with out sob. Patient presents with c/o recurrent throat infection and last infection was on 1/27/2018 and admitted to [facility] with fever of 105. Denies any c/o cp, sob, palpittaions, fever, cough or dysuria. Ex tolerance of 10 fos walk with out sob. No LUCHO.

## 2019-02-04 NOTE — H&P PST ADULT - NSANTHOSAYNRD_GEN_A_CORE
No. LUCHO screening performed.  STOP BANG Legend: 0-2 = LOW Risk; 3-4 = INTERMEDIATE Risk; 5-8 = HIGH Risk

## 2019-02-08 ENCOUNTER — APPOINTMENT (OUTPATIENT)
Dept: OTOLARYNGOLOGY | Facility: CLINIC | Age: 29
End: 2019-02-08
Payer: COMMERCIAL

## 2019-02-08 DIAGNOSIS — J03.91 ACUTE RECURRENT TONSILLITIS, UNSPECIFIED: ICD-10-CM

## 2019-02-08 PROCEDURE — 99212 OFFICE O/P EST SF 10 MIN: CPT

## 2019-02-08 NOTE — REASON FOR VISIT
[Subsequent Evaluation] : a subsequent evaluation for [FreeTextEntry2] : recurrent acute tonsillitis, f/up

## 2019-02-08 NOTE — ASSESSMENT
[FreeTextEntry1] : - recent tonsillitis infection resolved\par - surgery as scheduled 2/12/19 for tonsillectomy, reviewed again details of surgery, all questions answered

## 2019-02-08 NOTE — HISTORY OF PRESENT ILLNESS
[de-identified] : 28 year old patient is present today for recurrent acute tonsillitis for 10 years. Patient has h/o left tonsillectomy in 2008, when patient had it removed for a peritonsillar abscess. Since that time she has had about 1-2 throat infections over the past 10 years. Most recently, 1 month ago developed sore throat, with exudates on tonsil, went to urgent care told to go to ENT. Low grade fever at that time. Strep negative at that time. She does snore at times.  [FreeTextEntry1] : 2/8/19 Patient is following up for neck mass, and US results. Patient admits recently in the ED 1/27/19 due to another throat infection. She completed course of abx rx'ed in ED. Overall feeling better. No fevers. Went to pre-op testing. Surgery scheduled for 2/12/19.\par

## 2019-02-08 NOTE — PHYSICAL EXAM
[Midline] : trachea located in midline position [de-identified] : enlarged right tonsil, no exudates, left tonsil absent [Normal] : no rashes

## 2019-02-11 ENCOUNTER — OTHER (OUTPATIENT)
Age: 29
End: 2019-02-11

## 2019-02-11 DIAGNOSIS — G89.18 OTHER ACUTE POSTPROCEDURAL PAIN: ICD-10-CM

## 2019-02-11 RX ORDER — OXYCODONE AND ACETAMINOPHEN 5; 325 MG/1; MG/1
5-325 TABLET ORAL
Qty: 15 | Refills: 0 | Status: ACTIVE | COMMUNITY
Start: 2019-02-11 | End: 1900-01-01

## 2019-02-12 ENCOUNTER — OUTPATIENT (OUTPATIENT)
Dept: OUTPATIENT SERVICES | Facility: HOSPITAL | Age: 29
LOS: 1 days | Discharge: HOME | End: 2019-02-12

## 2019-02-12 ENCOUNTER — APPOINTMENT (OUTPATIENT)
Dept: OTOLARYNGOLOGY | Facility: AMBULATORY SURGERY CENTER | Age: 29
End: 2019-02-12
Payer: COMMERCIAL

## 2019-02-12 ENCOUNTER — RESULT REVIEW (OUTPATIENT)
Age: 29
End: 2019-02-12

## 2019-02-12 VITALS
HEART RATE: 66 BPM | WEIGHT: 176.37 LBS | DIASTOLIC BLOOD PRESSURE: 67 MMHG | SYSTOLIC BLOOD PRESSURE: 111 MMHG | HEIGHT: 66 IN | OXYGEN SATURATION: 100 % | TEMPERATURE: 98 F | RESPIRATION RATE: 17 BRPM

## 2019-02-12 VITALS
RESPIRATION RATE: 20 BRPM | TEMPERATURE: 98 F | DIASTOLIC BLOOD PRESSURE: 70 MMHG | OXYGEN SATURATION: 100 % | HEART RATE: 50 BPM | SYSTOLIC BLOOD PRESSURE: 120 MMHG

## 2019-02-12 DIAGNOSIS — Z90.89 ACQUIRED ABSENCE OF OTHER ORGANS: Chronic | ICD-10-CM

## 2019-02-12 PROCEDURE — 42826 REMOVAL OF TONSILS: CPT

## 2019-02-12 RX ORDER — ONDANSETRON 8 MG/1
4 TABLET, FILM COATED ORAL ONCE
Qty: 0 | Refills: 0 | Status: DISCONTINUED | OUTPATIENT
Start: 2019-02-12 | End: 2019-02-27

## 2019-02-12 RX ORDER — MORPHINE SULFATE 50 MG/1
2 CAPSULE, EXTENDED RELEASE ORAL
Qty: 0 | Refills: 0 | Status: DISCONTINUED | OUTPATIENT
Start: 2019-02-12 | End: 2019-02-12

## 2019-02-12 RX ORDER — OXYCODONE AND ACETAMINOPHEN 5; 325 MG/1; MG/1
1 TABLET ORAL EVERY 4 HOURS
Qty: 0 | Refills: 0 | Status: DISCONTINUED | OUTPATIENT
Start: 2019-02-12 | End: 2019-02-12

## 2019-02-12 RX ORDER — SODIUM CHLORIDE 9 MG/ML
1000 INJECTION, SOLUTION INTRAVENOUS
Qty: 0 | Refills: 0 | Status: DISCONTINUED | OUTPATIENT
Start: 2019-02-12 | End: 2019-02-27

## 2019-02-12 RX ADMIN — SODIUM CHLORIDE 100 MILLILITER(S): 9 INJECTION, SOLUTION INTRAVENOUS at 08:23

## 2019-02-12 NOTE — ASU DISCHARGE PLAN (ADULT/PEDIATRIC). - PAIN
prescription called to pharmacy/Can also take Tylenol or ibuprofen, do not exceed 3500mg Tylenol/day

## 2019-02-13 LAB — SURGICAL PATHOLOGY STUDY: SIGNIFICANT CHANGE UP

## 2019-02-14 DIAGNOSIS — J35.01 CHRONIC TONSILLITIS: ICD-10-CM

## 2020-04-26 ENCOUNTER — MESSAGE (OUTPATIENT)
Age: 30
End: 2020-04-26

## 2020-11-04 ENCOUNTER — EMERGENCY (EMERGENCY)
Facility: HOSPITAL | Age: 30
LOS: 0 days | Discharge: HOME | End: 2020-11-04
Attending: EMERGENCY MEDICINE | Admitting: EMERGENCY MEDICINE
Payer: OTHER MISCELLANEOUS

## 2020-11-04 VITALS
OXYGEN SATURATION: 97 % | HEIGHT: 66 IN | SYSTOLIC BLOOD PRESSURE: 128 MMHG | DIASTOLIC BLOOD PRESSURE: 67 MMHG | HEART RATE: 88 BPM | WEIGHT: 190.04 LBS | RESPIRATION RATE: 16 BRPM | TEMPERATURE: 99 F

## 2020-11-04 DIAGNOSIS — S61.231A PUNCTURE WOUND WITHOUT FOREIGN BODY OF LEFT INDEX FINGER WITHOUT DAMAGE TO NAIL, INITIAL ENCOUNTER: ICD-10-CM
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
## 2020-11-04 DIAGNOSIS — Z90.89 ACQUIRED ABSENCE OF OTHER ORGANS: ICD-10-CM

## 2020-11-04 DIAGNOSIS — Z90.89 ACQUIRED ABSENCE OF OTHER ORGANS: Chronic | ICD-10-CM
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
## 2020-11-04 PROCEDURE — 99284 EMERGENCY DEPT VISIT MOD MDM: CPT

## 2020-11-04 NOTE — ED PROVIDER NOTE - ATTENDING CONTRIBUTION TO CARE
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  Pt was administering an IM medication to patient when pt became upset and pulled away.  Pt stuck her left index finger after administering the med.  She cleaned area well,  She is UTD on Tetanus and Hepatitis vaccines.  Pt is [employer] [place].  There is no record of Hepatitis or HIV testing in our system. On exam pt in NAD AAO x 3, + puncture to left index finger, no swelling, no bleeding

## 2020-11-04 NOTE — ED ADULT TRIAGE NOTE - CHIEF COMPLAINT QUOTE
Pt is nurse for [employer]. 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

## 2020-11-04 NOTE — ED PROVIDER NOTE - CLINICAL SUMMARY MEDICAL DECISION MAKING FREE TEXT BOX
Needle stick protocol completed.  Pt unsure if source pt will be cooperative with blood draw.  Discussed with ID.  Pt offered PEP.  Will follow up with PMD. Pt instructed to return if any worsening symptoms or concerns.  They verbalize understanding.

## 2020-11-04 NOTE — ED ADULT NURSE NOTE - CHIEF COMPLAINT QUOTE
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

## 2020-11-04 NOTE — ED PROVIDER NOTE - NS ED ROS FT
Review of Systems:  	•	CONSTITUTIONAL - no fever, no diaphoresis, no chills  	•	SKIN - no rash  	•	HEMATOLOGIC - no bleeding, no bruising    	•	RESPIRATORY - no shortness of breath, no cough  	•	CARDIAC - no chest pain, no palpitations  	  	•	NEUROLOGIC - no weakness, no headache, no paresthesias, no LOC  	•	PSYCH - no anxiety, non suicidal, non homicidal, no hallucination, no depression

## 2020-11-04 NOTE — ED PROVIDER NOTE - OBJECTIVE STATEMENT
this is a 31 yo female presents to ed for evaluation after needle stick today while at work.  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 patient is know to be difficult

## 2020-11-04 NOTE — ED PROVIDER NOTE - PHYSICAL EXAMINATION
--EXAM--  VITAL SIGNS: I have reviewed vs documented at present.  CONSTITUTIONAL: Well-developed; well-nourished; in no acute distress.   SKIN: Warm and dry, no acute rash.       CARD: S1, S2, Regular rate and rhythm.   RESP: No wheezes, rales or rhonchi.    NEURO: Alert, oriented, grossly unremarkable. Strength 5/5 in all extremities. Sensation intact throughout.  PSYCH: Cooperative, appropriate.

## 2020-11-04 NOTE — ED PROVIDER NOTE - PATIENT PORTAL LINK FT
You can access the FollowMyHealth Patient Portal offered by [facility] by registering at the following website: [email or web address]. By joining [employer]’s FollowMyHealth portal, you will also be able to view your health information using other applications (apps) compatible with our system.

## 2020-11-05 PROBLEM — J35.01 CHRONIC TONSILLITIS: Chronic | Status: ACTIVE | Noted: 2019-02-04

## 2020-11-05 PROBLEM — G43.909 MIGRAINE, UNSPECIFIED, NOT INTRACTABLE, WITHOUT STATUS MIGRAINOSUS: Chronic | Status: ACTIVE | Noted: 2019-02-04

## 2021-03-24 ENCOUNTER — OUTPATIENT (OUTPATIENT)
Dept: OUTPATIENT SERVICES | Facility: HOSPITAL | Age: 31
LOS: 1 days | Discharge: HOME | End: 2021-03-24

## 2021-03-24 DIAGNOSIS — Z90.89 ACQUIRED ABSENCE OF OTHER ORGANS: Chronic | ICD-10-CM

## 2021-03-24 DIAGNOSIS — Z00.8 ENCOUNTER FOR OTHER GENERAL EXAMINATION: ICD-10-CM

## 2021-07-20 NOTE — DISCHARGE NOTE ADULT - MEDICATION SUMMARY - MEDICATIONS TO CHANGE
Dermatitis Treatment Plan       When hands and ears are itchy, red and scaly, follow this plan to care for your hands:    Step 1:  At bedtime, soak hands in a clean bowl of lukewarm tap water for 10-15   minutes.  Gently pat dry, but do not rub.  Skin should still be damp.    Step 2:  Apply topical creams immediately after soaking step, while skin is still damp.    Apply Vaseline to any open cracks on the skin.     Apply  hydrocortisone 2.5% ointment to affected areas on the hands.      Apply Vanicream moisturizer moisturizer to all other areas of the hands.    Step 3:  Put on white cotton gloves, or wrap hands lightly using gauze and an ACE bandage.     Step 4: Remove gloves in the morning and apply medication and moisturizer again.      Apply moisturizer immediately after bathing and after washing hands every time.    Use a mild soap when bathing and washing hands Vanicream bar soap or CeraVe bar soap.      Stop using medicated creams when skin is clear, and continue using moisturizer at least twice daily (and after bathing and washing hands) as maintenance treatment.  Use moisturizer every day even if skin appears clear to help reduce flares.     If you are going to wear rubber gloves, use a white cotton glove liner and change it frequently to help reduce moisture on the skin.        I will SWITCH the dose or number of times a day I take the medications listed below when I get home from the hospital:  None

## 2021-11-01 ENCOUNTER — TRANSCRIPTION ENCOUNTER (OUTPATIENT)
Age: 31
End: 2021-11-01

## 2021-11-04 ENCOUNTER — INPATIENT (INPATIENT)
Facility: HOSPITAL | Age: 31
LOS: 0 days | Discharge: HOME | End: 2021-11-05
Attending: OBSTETRICS & GYNECOLOGY | Admitting: OBSTETRICS & GYNECOLOGY
Payer: COMMERCIAL

## 2021-11-04 VITALS
DIASTOLIC BLOOD PRESSURE: 71 MMHG | RESPIRATION RATE: 16 BRPM | HEART RATE: 82 BPM | SYSTOLIC BLOOD PRESSURE: 124 MMHG | TEMPERATURE: 98 F

## 2021-11-04 DIAGNOSIS — Z90.89 ACQUIRED ABSENCE OF OTHER ORGANS: Chronic | ICD-10-CM

## 2021-11-04 LAB
BASOPHILS # BLD AUTO: 0.04 K/UL — SIGNIFICANT CHANGE UP (ref 0–0.2)
BASOPHILS NFR BLD AUTO: 0.2 % — SIGNIFICANT CHANGE UP (ref 0–1)
EOSINOPHIL # BLD AUTO: 0.01 K/UL — SIGNIFICANT CHANGE UP (ref 0–0.7)
EOSINOPHIL NFR BLD AUTO: 0.1 % — SIGNIFICANT CHANGE UP (ref 0–8)
HCT VFR BLD CALC: 37.2 % — SIGNIFICANT CHANGE UP (ref 37–47)
HGB BLD-MCNC: 11.8 G/DL — LOW (ref 12–16)
HIV 1 & 2 AB SERPL IA.RAPID: SIGNIFICANT CHANGE UP
IMM GRANULOCYTES NFR BLD AUTO: 0.8 % — HIGH (ref 0.1–0.3)
LYMPHOCYTES # BLD AUTO: 1.37 K/UL — SIGNIFICANT CHANGE UP (ref 1.2–3.4)
LYMPHOCYTES # BLD AUTO: 8 % — LOW (ref 20.5–51.1)
MCHC RBC-ENTMCNC: 26 PG — LOW (ref 27–31)
MCHC RBC-ENTMCNC: 31.7 G/DL — LOW (ref 32–37)
MCV RBC AUTO: 81.9 FL — SIGNIFICANT CHANGE UP (ref 81–99)
MONOCYTES # BLD AUTO: 0.49 K/UL — SIGNIFICANT CHANGE UP (ref 0.1–0.6)
MONOCYTES NFR BLD AUTO: 2.8 % — SIGNIFICANT CHANGE UP (ref 1.7–9.3)
NEUTROPHILS # BLD AUTO: 15.17 K/UL — HIGH (ref 1.4–6.5)
NEUTROPHILS NFR BLD AUTO: 88.1 % — HIGH (ref 42.2–75.2)
NRBC # BLD: 0 /100 WBCS — SIGNIFICANT CHANGE UP (ref 0–0)
PLATELET # BLD AUTO: 329 K/UL — SIGNIFICANT CHANGE UP (ref 130–400)
PRENATAL SYPHILIS TEST: SIGNIFICANT CHANGE UP
RBC # BLD: 4.54 M/UL — SIGNIFICANT CHANGE UP (ref 4.2–5.4)
RBC # FLD: 15.4 % — HIGH (ref 11.5–14.5)
SARS-COV-2 RNA SPEC QL NAA+PROBE: SIGNIFICANT CHANGE UP
WBC # BLD: 17.21 K/UL — HIGH (ref 4.8–10.8)
WBC # FLD AUTO: 17.21 K/UL — HIGH (ref 4.8–10.8)

## 2021-11-04 RX ORDER — SODIUM CHLORIDE 9 MG/ML
1000 INJECTION, SOLUTION INTRAVENOUS
Refills: 0 | Status: DISCONTINUED | OUTPATIENT
Start: 2021-11-04 | End: 2021-11-04

## 2021-11-04 RX ORDER — NALOXONE HYDROCHLORIDE 4 MG/.1ML
0.1 SPRAY NASAL
Refills: 0 | Status: DISCONTINUED | OUTPATIENT
Start: 2021-11-04 | End: 2021-11-05

## 2021-11-04 RX ORDER — DEXAMETHASONE 0.5 MG/5ML
4 ELIXIR ORAL EVERY 6 HOURS
Refills: 0 | Status: DISCONTINUED | OUTPATIENT
Start: 2021-11-04 | End: 2021-11-05

## 2021-11-04 RX ORDER — MAGNESIUM HYDROXIDE 400 MG/1
30 TABLET, CHEWABLE ORAL
Refills: 0 | Status: DISCONTINUED | OUTPATIENT
Start: 2021-11-04 | End: 2021-11-05

## 2021-11-04 RX ORDER — ONDANSETRON 8 MG/1
4 TABLET, FILM COATED ORAL EVERY 6 HOURS
Refills: 0 | Status: DISCONTINUED | OUTPATIENT
Start: 2021-11-04 | End: 2021-11-05

## 2021-11-04 RX ORDER — DIPHENHYDRAMINE HCL 50 MG
25 CAPSULE ORAL EVERY 6 HOURS
Refills: 0 | Status: DISCONTINUED | OUTPATIENT
Start: 2021-11-04 | End: 2021-11-05

## 2021-11-04 RX ORDER — BENZOCAINE 10 %
1 GEL (GRAM) MUCOUS MEMBRANE EVERY 6 HOURS
Refills: 0 | Status: DISCONTINUED | OUTPATIENT
Start: 2021-11-04 | End: 2021-11-05

## 2021-11-04 RX ORDER — HYDROCORTISONE 1 %
1 OINTMENT (GRAM) TOPICAL EVERY 6 HOURS
Refills: 0 | Status: DISCONTINUED | OUTPATIENT
Start: 2021-11-04 | End: 2021-11-05

## 2021-11-04 RX ORDER — KETOROLAC TROMETHAMINE 30 MG/ML
30 SYRINGE (ML) INJECTION ONCE
Refills: 0 | Status: DISCONTINUED | OUTPATIENT
Start: 2021-11-04 | End: 2021-11-04

## 2021-11-04 RX ORDER — LANOLIN
1 OINTMENT (GRAM) TOPICAL EVERY 6 HOURS
Refills: 0 | Status: DISCONTINUED | OUTPATIENT
Start: 2021-11-04 | End: 2021-11-05

## 2021-11-04 RX ORDER — SIMETHICONE 80 MG/1
80 TABLET, CHEWABLE ORAL EVERY 4 HOURS
Refills: 0 | Status: DISCONTINUED | OUTPATIENT
Start: 2021-11-04 | End: 2021-11-05

## 2021-11-04 RX ORDER — PRAMOXINE HYDROCHLORIDE 150 MG/15G
1 AEROSOL, FOAM RECTAL EVERY 4 HOURS
Refills: 0 | Status: DISCONTINUED | OUTPATIENT
Start: 2021-11-04 | End: 2021-11-05

## 2021-11-04 RX ORDER — DIBUCAINE 1 %
1 OINTMENT (GRAM) RECTAL EVERY 6 HOURS
Refills: 0 | Status: DISCONTINUED | OUTPATIENT
Start: 2021-11-04 | End: 2021-11-05

## 2021-11-04 RX ORDER — ACETAMINOPHEN 500 MG
975 TABLET ORAL
Refills: 0 | Status: DISCONTINUED | OUTPATIENT
Start: 2021-11-04 | End: 2021-11-05

## 2021-11-04 RX ORDER — OXYTOCIN 10 UNIT/ML
333.33 VIAL (ML) INJECTION
Qty: 20 | Refills: 0 | Status: COMPLETED | OUTPATIENT
Start: 2021-11-04 | End: 2021-11-04

## 2021-11-04 RX ORDER — IBUPROFEN 200 MG
600 TABLET ORAL EVERY 6 HOURS
Refills: 0 | Status: DISCONTINUED | OUTPATIENT
Start: 2021-11-04 | End: 2021-11-05

## 2021-11-04 RX ORDER — SODIUM CHLORIDE 9 MG/ML
3 INJECTION INTRAMUSCULAR; INTRAVENOUS; SUBCUTANEOUS EVERY 8 HOURS
Refills: 0 | Status: DISCONTINUED | OUTPATIENT
Start: 2021-11-04 | End: 2021-11-05

## 2021-11-04 RX ORDER — TETANUS TOXOID, REDUCED DIPHTHERIA TOXOID AND ACELLULAR PERTUSSIS VACCINE, ADSORBED 5; 2.5; 8; 8; 2.5 [IU]/.5ML; [IU]/.5ML; UG/.5ML; UG/.5ML; UG/.5ML
0.5 SUSPENSION INTRAMUSCULAR ONCE
Refills: 0 | Status: DISCONTINUED | OUTPATIENT
Start: 2021-11-04 | End: 2021-11-05

## 2021-11-04 RX ORDER — IBUPROFEN 200 MG
600 TABLET ORAL EVERY 6 HOURS
Refills: 0 | Status: COMPLETED | OUTPATIENT
Start: 2021-11-04 | End: 2022-10-03

## 2021-11-04 RX ORDER — OXYCODONE HYDROCHLORIDE 5 MG/1
5 TABLET ORAL ONCE
Refills: 0 | Status: DISCONTINUED | OUTPATIENT
Start: 2021-11-04 | End: 2021-11-05

## 2021-11-04 RX ORDER — AER TRAVELER 0.5 G/1
1 SOLUTION RECTAL; TOPICAL EVERY 4 HOURS
Refills: 0 | Status: DISCONTINUED | OUTPATIENT
Start: 2021-11-04 | End: 2021-11-05

## 2021-11-04 RX ORDER — INFLUENZA VIRUS VACCINE 15; 15; 15; 15 UG/.5ML; UG/.5ML; UG/.5ML; UG/.5ML
0.5 SUSPENSION INTRAMUSCULAR ONCE
Refills: 0 | Status: COMPLETED | OUTPATIENT
Start: 2021-11-04 | End: 2021-11-04

## 2021-11-04 RX ORDER — OXYCODONE HYDROCHLORIDE 5 MG/1
5 TABLET ORAL
Refills: 0 | Status: DISCONTINUED | OUTPATIENT
Start: 2021-11-04 | End: 2021-11-05

## 2021-11-04 RX ADMIN — Medication 1 APPLICATION(S): at 17:29

## 2021-11-04 RX ADMIN — Medication 30 MILLIGRAM(S): at 11:18

## 2021-11-04 RX ADMIN — Medication 600 MILLIGRAM(S): at 19:26

## 2021-11-04 RX ADMIN — Medication 1000 MILLIUNIT(S)/MIN: at 11:18

## 2021-11-04 RX ADMIN — Medication 1 TABLET(S): at 15:41

## 2021-11-04 RX ADMIN — SODIUM CHLORIDE 3 MILLILITER(S): 9 INJECTION INTRAMUSCULAR; INTRAVENOUS; SUBCUTANEOUS at 15:34

## 2021-11-04 RX ADMIN — Medication 30 MILLIGRAM(S): at 10:31

## 2021-11-04 RX ADMIN — SODIUM CHLORIDE 3 MILLILITER(S): 9 INJECTION INTRAMUSCULAR; INTRAVENOUS; SUBCUTANEOUS at 21:46

## 2021-11-04 RX ADMIN — Medication 975 MILLIGRAM(S): at 21:41

## 2021-11-04 RX ADMIN — Medication 600 MILLIGRAM(S): at 19:00

## 2021-11-04 NOTE — OB RN TRIAGE NOTE - CURRENT PREGNANCY COMPLICATIONS, OB PROFILE
[FreeTextEntry1] : Check urinalysis  and urine culture. Start cream. Increase fluids. Hygiene reviewed in regards to the bathroom and  Monitor for worse symptoms of fever, chills, dysuria, hematuria, nausea, vomiting or back pain. Call the office or go the ER if worse.\par try estrace cream for atrophy\par check us bladder sonogram ro pvr\par We spent sufficient time to discuss aspects of care; questions were answered  to patient's satisfaction.The diagnosis and care plan were discussed with patient in detail.  Patient test results were  reviewed and explained in full. All questions and concerns  were answered to the best of my knowledge.\par \par \par 
None

## 2021-11-04 NOTE — OB RN PATIENT PROFILE - PATIENT REPRESENTATIVE: ( YOU CAN CHOOSE ANY PERSON THAT CAN ASSIST YOU WITH YOUR HEALTH CARE PREFERENCES, DOES NOT HAVE TO BE A SPOUSE, IMMEDIATE FAMILY OR SIGNIFICANT OTHER/PARTNER)
Refill Authorization Note     is requesting a refill authorization.    Brief assessment and rationale for refill: DEFER: last commented no longer taking  Name and strength of medication: amLODIPine (NORVASC) 5 MG tablet       Medication Therapy Plan: dc'd 7/19 by Dr. Alberto, cardiologist    Medication reconciliation completed: No                         Comments:   Requested Prescriptions   Pending Prescriptions Disp Refills    amLODIPine (NORVASC) 5 MG tablet [Pharmacy Med Name: AMLODIPINE BESYLATE 5MG TABLETS] 90 tablet 0     Sig: TAKE 1 TABLET BY MOUTH ONCE DAILY. USE THE MORNING OF SURGERY WITH A SIP OF WATER       Cardiovascular:  Calcium Channel Blockers Passed - 11/18/2019  1:57 PM        Passed - Patient is at least 18 years old        Passed - Last BP in normal range within 360 days     BP Readings from Last 3 Encounters:   10/08/19 (!) 108/54   10/07/19 (!) 142/70   09/04/19 116/78              Passed - Office visit in past 12 months or future 90 days     Recent Outpatient Visits            1 month ago Nodular calcific aortic valve stenosis    [facility]    2 months ago Acute idiopathic gout of left foot    [facility] Aby Perales MD    3 months ago Neck pain    [facility] Aby Perales MD    4 months ago Severe aortic stenosis    [place] - Cardiology Terrie Alberto MD    7 months ago Nonrheumatic aortic valve stenosis    [place] - Cardiology Terrie Alberto MD          Future Appointments              In 1 week Aby Perales MD [facility], [place]    In 2 weeks [facility]    In 2 weeks ECHO, [facility], [facility]    In 2 weeks [facility]                   Declines

## 2021-11-04 NOTE — PROCEDURE NOTE - NSANTHDIETYNSD_GEN_ALL_CORE
Documentation clarification is required for compliance, accuracy in coding and billing, claim validation and reporting severity of illness, quality data and risk of mortality.
Yes

## 2021-11-04 NOTE — PROCEDURE NOTE - ADDITIONAL PROCEDURE DETAILS
VSS post epidural placement  Analgesia at T10 R=L  FH stable VSS post epidural placement  Analgesia at T10 R=L  FH stable          Vital signs stable  No anesthesia complications from labor epidural  Patient discharged to OB post partum care as per policy

## 2021-11-04 NOTE — OB RN DELIVERY SUMMARY - NS_SEPSISRSKCALC_OBGYN_ALL_OB_FT
GBS status in the 'Prenatal Lab tests/results section' on the OB RN Patient Profile must be documented.   EOS calculated successfully. EOS Risk Factor: 0.5/1000 live births ([facility] national incidence); GA=39w3d; Temp=98.1; ROM=1.633; GBS='Unknown'; Antibiotics='No antibiotics or any antibiotics < 2 hrs prior to birth'

## 2021-11-04 NOTE — OB PROVIDER H&P - NSHPLABSRESULTS_GEN_ALL_CORE
No prenatal chart available. Prenatal chart to be faxed to l&d. sonograms unavailable sonograms unavailable    Elevated gct, normal gtt per dr kimble verbally

## 2021-11-04 NOTE — OB PROVIDER H&P - NSHPPHYSICALEXAM_GEN_ALL_CORE
Physical exam:    Vital Signs Last 24 Hrs  T(F): 98.1 (04 Nov 2021 03:46), Max: 98.1 (04 Nov 2021 03:46)  HR: 82 (04 Nov 2021 03:49) (82 - 82)  BP: 124/71 (04 Nov 2021 03:49) (124/71 - 124/71)  RR: 16 (04 Nov 2021 03:46) (16 - 16)    Gen: NAD  Abdomen: soft, gravid, nontender, with palpable contractions    EFM: 150/mod/pos acc  toco: 5 mins  SVE: 6/90/-1, vtx, intact

## 2021-11-04 NOTE — OB PROVIDER H&P - HISTORY OF PRESENT ILLNESS
32 yo  @39w3d, CINDI 21 dated by LMP c/w first trimester sono, presents complaining of contractions that started yesterday and are now 4mins apart in frequency and 7/10 intensity. Denies any complications with this pregnancy. History of migraines, not on topiramate during this pregnancy. GBS negative per patient.

## 2021-11-04 NOTE — OB PROVIDER DELIVERY SUMMARY - NSPROVIDERDELIVERYNOTE_OBGYN_ALL_OB_FT
Pt delivered a live female infant in ADRIÁN presentation with left hand compound presentation over an intact perineum. Placenta was delivered intact. EBL 200ml

## 2021-11-04 NOTE — OB RN PATIENT PROFILE - BREASTFEEDING PROVIDES MATERNAL HEALTH BENEFITS, DECREASED PREMENOPAUSAL BREAST CANCER, OVARIAN CANCER AND TYPE II DIABETES MELLITUS
INITIAL PSYCHIATRIC EVALUATION            IDENTIFICATION:    Patient Name  Dan Simmonds   Date of Birth 1984   [facility] [id number]   Medical Record Number  [id number]      Age  30 y.o. PCP Sully Sweeney DO   Admit date:  2/17/2018    Room Number  727/02  @ [facility]   Date of Service  2/18/2018            HISTORY         A psychiatric consultation was requested by Lashonda Longoria MD to evaluate or provide advice/opinion related to evaluating for depression   CC: feeling depressed    HISTORY OF PRESENT ILLNESS:    The patient, Dan Simmonds, is a 30 y.o. WHITE [place]  female with a past psychiatric history significant for depression who was admitted to the medical floor for the treatment of low blood pressure , she stated she does not get along with her mother and brother that they do not believe she is physically sick and she is  Upset and feels  Down and she stated she lied about her suicidal attempt and she was trying to get help from here but she does not want to go to Psych floor , she stated she is not suicidal now . She denied psychotic features   She stated she wants to leave as early as possible , she stated she is missing her Adderall and she is infrequently use Marijuana  And she called her mother and she will leave her to live with her mother . SHe stated she has been admitted to psychiatry twelve years ago , she does not feel being here is helping and she wants to go back to her job , denied current suicidal ideation             ALLERGIES: No Known Allergies   MEDICATIONS PRIOR TO ADMISSION:   Prescriptions Prior to Admission   Medication Sig    sertraline (ZOLOFT) 25 mg tablet Take 25 mg by mouth daily.  fludrocortisone (FLORINEF) 0.1 mg tablet Take 2 Tabs by mouth daily.  SYNTHROID 125 mcg tablet TAKE 1/2  TABLET BY MOUTH DAILY    multivitamin (ONE A DAY) tablet Take 1 Tab by mouth daily.  ASHWAGANDHA ROOT EXTRACT,BULK, by Does Not Apply route daily.     amphetamine-dextroamphetamine XR (ADDERALL XR) 20 mg XR capsule Take 20 mg by mouth two (2) times a day. PAST MEDICAL HISTORY:   Past Medical History:   Diagnosis Date    Depression     Hashimoto's encephalopathy     Hypothyroid     Hypothyroid     Insomnia     Migraine     Shuffling gait 10/18/2016    --intermittent when head was shaking/bobbing     Vertigo    No past surgical history on file. SOCIAL HISTORY: single    Social History     Social History    Marital status: SINGLE     Spouse name: N/A    Number of children: N/A    Years of education: N/A     Occupational History    Not on file. Social History Main Topics    Smoking status: Never Smoker    Smokeless tobacco: Never Used    Alcohol use Yes      Comment: rarely    Drug use: No    Sexual activity: Not on file     Other Topics Concern    Not on file     Social History Narrative      FAMILY HISTORY: History reviewed. No pertinent family history. Family History   Problem Relation Age of Onset    Heart Disease Maternal Grandfather     Cancer Paternal Grandmother     Heart Disease Paternal Grandmother        REVIEW OF SYSTEMS:   History obtained from the patient  Pertinent items are noted in the History of Present Illness. All other Systems reviewed and are considered negative. MENTAL STATUS EXAM & VITALS     MENTAL STATUS EXAM (MSE):    MSE FINDINGS ARE WITHIN NORMAL LIMITS (WNL) UNLESS OTHERWISE STATED BELOW. ( ALL OF THE BELOW CATEGORIES OF THE MSE HAVE BEEN REVIEWED (reviewed 2/18/2018) AND UPDATED AS DEEMED APPROPRIATE )  General Presentation age appropriate, cooperative   Orientation oriented to time, place and person   Vital Signs  See below (reviewed 2/18/2018); Vital Signs (BP, Pulse, & Temp) are within normal limits if not listed below.    Gait and Station Stable/steady, no ataxia   Musculoskeletal System No extrapyramidal symptoms (EPS); no abnormal muscular movements or Tardive Dyskinesia (TD); muscle strength and tone are within normal limits   Language No aphasia or dysarthria   Speech:  normal pitch and normal volume   Thought Processes logical; slow rate of thoughts; fair abstract reasoning/computation   Thought Associations goal directed   Thought Content free of delusions   Suicidal Ideations no plan    Homicidal Ideations no plan    Mood:  anxious    Affect:  anxious   Memory recent  intact   Memory remote:  intact   Concentration/Attention:  good   Fund of Knowledge average   Insight:  good   Reliability fair   Judgment:  limited          VITALS:     Patient Vitals for the past 24 hrs:   Temp Pulse Resp BP SpO2   02/18/18 0831 98.4 °F (36.9 °C) 89 18 95/65 97 %   02/17/18 2317 - 80 - 111/78 -   02/17/18 2032 98.2 °F (36.8 °C) 79 16 112/76 -   02/17/18 1540 98.1 °F (36.7 °C) 82 18 132/77 100 %     Wt Readings from Last 3 Encounters:   02/15/18 65.3 kg (144 lb)   01/05/18 62.6 kg (138 lb)   03/31/17 67.1 kg (148 lb)     Temp Readings from Last 3 Encounters:   02/18/18 98.4 °F (36.9 °C)   02/17/18 98.3 °F (36.8 °C)   02/16/18 98.1 °F (36.7 °C)     BP Readings from Last 3 Encounters:   02/18/18 95/65   02/17/18 (!) 142/97   02/16/18 (!) 88/53     Pulse Readings from Last 3 Encounters:   02/18/18 89   02/17/18 84   02/16/18 85            DATA     LABORATORY DATA:  Labs Reviewed - No data to display  Admission on 02/17/2018, Discharged on 02/17/2018   Component Date Value Ref Range Status    Sodium 02/17/2018 141  136 - 145 mmol/L Final    Potassium 02/17/2018 3.7  3.5 - 5.1 mmol/L Final    Chloride 02/17/2018 106  97 - 108 mmol/L Final    CO2 02/17/2018 29  21 - 32 mmol/L Final    Anion gap 02/17/2018 6  5 - 15 mmol/L Final    Glucose 02/17/2018 124* 65 - 100 mg/dL Final    BUN 02/17/2018 12  6 - 20 MG/DL Final    Creatinine 02/17/2018 0.69  0.55 - 1.02 MG/DL Final    BUN/Creatinine ratio 02/17/2018 17  12 - 20   Final    GFR est AA 02/17/2018 >60  >60 ml/min/1.73m2 Final    GFR est non-AA 02/17/2018 >60  >60 ml/min/1.73m2 Final    Calcium 02/17/2018 8.0* 8.5 - 10.1 MG/DL Final    Bilirubin, total 02/17/2018 0.4  0.2 - 1.0 MG/DL Final    ALT (SGPT) 02/17/2018 17  12 - 78 U/L Final    AST (SGOT) 02/17/2018 11* 15 - 37 U/L Final    Alk. phosphatase 02/17/2018 31* 45 - 117 U/L Final    Protein, total 02/17/2018 5.9* 6.4 - 8.2 g/dL Final    Albumin 02/17/2018 2.9* 3.5 - 5.0 g/dL Final    Globulin 02/17/2018 3.0  2.0 - 4.0 g/dL Final    A-G Ratio 02/17/2018 1.0* 1.1 - 2.2   Final    LIPID PROFILE 02/17/2018        Final    Cholesterol, total 02/17/2018 153  <200 MG/DL Final    Triglyceride 02/17/2018 119  <150 MG/DL Final    HDL Cholesterol 02/17/2018 62  MG/DL Final    LDL, calculated 02/17/2018 67.2  0 - 100 MG/DL Final    VLDL, calculated 02/17/2018 23.8  MG/DL Final    CHOL/HDL Ratio 02/17/2018 2.5  0 - 5.0   Final    WBC 02/17/2018 4.8  3.6 - 11.0 K/uL Final    RBC 02/17/2018 4.12  3.80 - 5.20 M/uL Final    HGB 02/17/2018 12.4  11.5 - 16.0 g/dL Final    HCT 02/17/2018 36.5  35.0 - 47.0 % Final    MCV 02/17/2018 88.6  80.0 - 99.0 FL Final    MCH 02/17/2018 30.1  26.0 - 34.0 PG Final    MCHC 02/17/2018 34.0  30.0 - 36.5 g/dL Final    RDW 02/17/2018 13.4  11.5 - 14.5 % Final    PLATELET 22/38/6234 792* 150 - 400 K/uL Final    MPV 02/17/2018 11.9  8.9 - 12.9 FL Final    NRBC 02/17/2018 0.0  0  WBC Final    ABSOLUTE NRBC 02/17/2018 0.00  0.00 - 0.01 K/uL Final    NEUTROPHILS 02/17/2018 46  32 - 75 % Final    LYMPHOCYTES 02/17/2018 41  12 - 49 % Final    MONOCYTES 02/17/2018 9  5 - 13 % Final    EOSINOPHILS 02/17/2018 4  0 - 7 % Final    BASOPHILS 02/17/2018 0  0 - 1 % Final    IMMATURE GRANULOCYTES 02/17/2018 0  0.0 - 0.5 % Final    ABS. NEUTROPHILS 02/17/2018 2.2  1.8 - 8.0 K/UL Final    ABS. LYMPHOCYTES 02/17/2018 2.0  0.8 - 3.5 K/UL Final    ABS. MONOCYTES 02/17/2018 0.4  0.0 - 1.0 K/UL Final    ABS. EOSINOPHILS 02/17/2018 0.2  0.0 - 0.4 K/UL Final    ABS. BASOPHILS 02/17/2018 0.0  0.0 - 0.1 K/UL Final    ABS. IMM. GRANS. 02/17/2018 0.0  0.00 - 0.04 K/UL Final    DF 02/17/2018 AUTOMATED    Final    Magnesium 02/17/2018 2.1  1.6 - 2.4 mg/dL Final    Phosphorus 02/17/2018 4.0  2.6 - 4.7 MG/DL Final   Admission on 02/15/2018, Discharged on 02/17/2018   Component Date Value Ref Range Status    Color 02/16/2018 YELLOW/STRAW    Final    Appearance 02/16/2018 CLEAR  CLEAR   Final    Specific gravity 02/16/2018 1.024  1.003 - 1.030   Final    pH (UA) 02/16/2018 6.5  5.0 - 8.0   Final    Protein 02/16/2018 NEGATIVE   NEG mg/dL Final    Glucose 02/16/2018 NEGATIVE   NEG mg/dL Final    Ketone 02/16/2018 TRACE* NEG mg/dL Final    Bilirubin 02/16/2018 NEGATIVE   NEG   Final    Blood 02/16/2018 NEGATIVE   NEG   Final    Urobilinogen 02/16/2018 0.2  0.2 - 1.0 EU/dL Final    Nitrites 02/16/2018 NEGATIVE   NEG   Final    Leukocyte Esterase 02/16/2018 NEGATIVE   NEG   Final    WBC 02/16/2018 0-4  0 - 4 /hpf Final    RBC 02/16/2018 0-5  0 - 5 /hpf Final    Epithelial cells 02/16/2018 FEW  FEW /lpf Final    Bacteria 02/16/2018 NEGATIVE   NEG /hpf Final    Hyaline cast 02/16/2018 0-2  0 - 5 /lpf Final    Urine culture hold 02/16/2018 URINE ON HOLD IN MICROBIOLOGY DEPT FOR 3 DAYS. IF UNPRESERVED URINE IS SUBMITTED, IT CANNOT BE USED FOR ADDITIONAL TESTING AFTER 24 HRS, RECOLLECTION WILL BE REQUIRED.     Final    WBC 02/15/2018 5.4  3.6 - 11.0 K/uL Final    RBC 02/15/2018 4.34  3.80 - 5.20 M/uL Final    HGB 02/15/2018 12.9  11.5 - 16.0 g/dL Final    HCT 02/15/2018 39.0  35.0 - 47.0 % Final    MCV 02/15/2018 89.9  80.0 - 99.0 FL Final    MCH 02/15/2018 29.7  26.0 - 34.0 PG Final    MCHC 02/15/2018 33.1  30.0 - 36.5 g/dL Final    RDW 02/15/2018 13.4  11.5 - 14.5 % Final    PLATELET 17/08/6704 543  150 - 400 K/uL Final    MPV 02/15/2018 12.1  8.9 - 12.9 FL Final    NRBC 02/15/2018 0.0  0  WBC Final    ABSOLUTE NRBC 02/15/2018 0.00  0.00 - 0.01 K/uL Final  NEUTROPHILS 02/15/2018 51  32 - 75 % Final    LYMPHOCYTES 02/15/2018 37  12 - 49 % Final    MONOCYTES 02/15/2018 8  5 - 13 % Final    EOSINOPHILS 02/15/2018 4  0 - 7 % Final    BASOPHILS 02/15/2018 0  0 - 1 % Final    IMMATURE GRANULOCYTES 02/15/2018 0  0.0 - 0.5 % Final    ABS. NEUTROPHILS 02/15/2018 2.8  1.8 - 8.0 K/UL Final    ABS. LYMPHOCYTES 02/15/2018 2.0  0.8 - 3.5 K/UL Final    ABS. MONOCYTES 02/15/2018 0.5  0.0 - 1.0 K/UL Final    ABS. EOSINOPHILS 02/15/2018 0.2  0.0 - 0.4 K/UL Final    ABS. BASOPHILS 02/15/2018 0.0  0.0 - 0.1 K/UL Final    ABS. IMM. GRANS. 02/15/2018 0.0  0.00 - 0.04 K/UL Final    DF 02/15/2018 AUTOMATED    Final    Sodium 02/15/2018 140  136 - 145 mmol/L Final    Potassium 02/15/2018 3.6  3.5 - 5.1 mmol/L Final    Chloride 02/15/2018 104  97 - 108 mmol/L Final    CO2 02/15/2018 27  21 - 32 mmol/L Final    Anion gap 02/15/2018 9  5 - 15 mmol/L Final    Glucose 02/15/2018 68  65 - 100 mg/dL Final    BUN 02/15/2018 14  6 - 20 MG/DL Final    Creatinine 02/15/2018 0.64  0.55 - 1.02 MG/DL Final    BUN/Creatinine ratio 02/15/2018 22* 12 - 20   Final    GFR est AA 02/15/2018 >60  >60 ml/min/1.73m2 Final    GFR est non-AA 02/15/2018 >60  >60 ml/min/1.73m2 Final    Calcium 02/15/2018 8.3* 8.5 - 10.1 MG/DL Final    Bilirubin, total 02/15/2018 0.7  0.2 - 1.0 MG/DL Final    ALT (SGPT) 02/15/2018 18  12 - 78 U/L Final    AST (SGOT) 02/15/2018 8* 15 - 37 U/L Final    Alk.  phosphatase 02/15/2018 27* 45 - 117 U/L Final    Protein, total 02/15/2018 6.8  6.4 - 8.2 g/dL Final    Albumin 02/15/2018 3.4* 3.5 - 5.0 g/dL Final    Globulin 02/15/2018 3.4  2.0 - 4.0 g/dL Final    A-G Ratio 02/15/2018 1.0* 1.1 - 2.2   Final    ALCOHOL(ETHYL),SERUM 02/15/2018 <10  <10 MG/DL Final    Acetaminophen level 02/15/2018 <2* 10 - 30 ug/mL Final    Salicylate level 65/19/8964 <1.7* 2.8 - 20.0 MG/DL Final    Phenobarbital 02/15/2018 27.8  15.0 - 40.0 ug/mL Final    Ventricular Rate 02/15/2018 57  BPM Final    Atrial Rate 02/15/2018 57  BPM Final    P-R Interval 02/15/2018 130  ms Final    QRS Duration 02/15/2018 70  ms Final    Q-T Interval 02/15/2018 430  ms Final    QTC Calculation (Bezet) 02/15/2018 418  ms Final    Calculated P Axis 02/15/2018 49  degrees Final    Calculated R Axis 02/15/2018 70  degrees Final    Calculated T Axis 02/15/2018 54  degrees Final    Diagnosis 02/15/2018    Final                    Value:Sinus bradycardia  When compared with ECG of 17-OCT-2016 20:59,  No significant change was found  Confirmed by Nahun Child M.D., Katherine Weeks ([id number]) on 2/16/2018 12:26:14 PM      Pregnancy test,urine (POC) 02/16/2018 NEGATIVE   NEG   Final    AMPHETAMINES 02/16/2018 POSITIVE* NEG   Final    BARBITURATES 02/16/2018 POSITIVE* NEG   Final    BENZODIAZEPINES 02/16/2018 NEGATIVE   NEG   Final    COCAINE 02/16/2018 NEGATIVE   NEG   Final    METHADONE 02/16/2018 NEGATIVE   NEG   Final    OPIATES 02/16/2018 NEGATIVE   NEG   Final    PCP(PHENCYCLIDINE) 02/16/2018 NEGATIVE   NEG   Final    THC (TH-CANNABINOL) 02/16/2018 POSITIVE* NEG   Final    Drug screen comment 02/16/2018 (NOTE)   Final    Phenobarbital 02/16/2018 24.9  15.0 - 40.0 ug/mL Final    TSH 02/16/2018 1.16  0.36 - 3.74 uIU/mL Final        RADIOLOGY REPORTS:    Results from Hospital Encounter encounter on 01/23/15   XR CHEST PORT   Narrative **Final Report**      ICD Codes / Adm. Diagnosis: 777675   / Dizziness  Dizziness  Examination:  CR CHEST PORT  - [id number] - Jan 23 2015  4:57PM  Accession No:  [id number]  Reason:  Dizziness      REPORT:  EXAM:  CR CHEST PORT    INDICATION:  Dizziness    COMPARISON:  None. FINDINGS: A portable AP radiograph of the chest was obtained at 1651 hours. The heart size is normal. The lungs are clear. IMPRESSION:  1.  No acute process           Signing/Reading Doctor: Stephen Goddard ([id number])    Approved: Stephen Goddard ([id number])  Jan 23 2015  5:02PM Ct Head Wo Cont    Result Date: 2/16/2018  INDICATION:   AMS EXAM:  HEAD CT WITHOUT CONTRAST COMPARISON:  January 24, 2015 TECHNIQUE:  Routine noncontrast axial head CT was performed. Sagittal and coronal reconstructions were generated. CT dose reduction was achieved through use of a standardized protocol tailored for this examination and automatic exposure control for dose modulation. Adaptive statistical iterative reconstruction (ASIR) was utilized. FINDINGS: Ventricles:  Midline, no hydrocephalus. Intracranial Hemorrhage:  None. Brain Parenchyma/Brainstem:  Normal for age. Basal Cisterns:  Normal. Paranasal Sinuses:  Visualized sinuses are clear. Additional Comments:  N/A. IMPRESSION: No acute process.               MEDICATIONS       ALL MEDICATIONS  Current Facility-Administered Medications   Medication Dose Route Frequency    acetaminophen (TYLENOL) tablet 650 mg  650 mg Oral Q4H PRN    docusate sodium (COLACE) capsule 100 mg  100 mg Oral BID    fludrocortisone (FLORINEF) tablet 200 mcg  0.2 mg Oral DAILY    levothyroxine (SYNTHROID) tablet 62.5 mcg  62.5 mcg Oral 6am    sertraline (ZOLOFT) tablet 25 mg  25 mg Oral DAILY    ziprasidone (GEODON) 20 mg in sterile water (preservative free) 1 mL injection  20 mg IntraMUSCular BID PRN    OLANZapine (ZyPREXA) tablet 5 mg  5 mg Oral Q6H PRN    benztropine (COGENTIN) tablet 2 mg  2 mg Oral BID PRN    benztropine (COGENTIN) injection 2 mg  2 mg IntraMUSCular BID PRN    LORazepam (ATIVAN) injection 2 mg  2 mg IntraMUSCular Q4H PRN    LORazepam (ATIVAN) tablet 1 mg  1 mg Oral Q4H PRN    acetaminophen (TYLENOL) tablet 650 mg  650 mg Oral Q4H PRN    ibuprofen (MOTRIN) tablet 400 mg  400 mg Oral Q8H PRN    magnesium hydroxide (MILK OF MAGNESIA) 400 mg/5 mL oral suspension 30 mL  30 mL Oral DAILY PRN    nicotine (NICODERM CQ) 21 mg/24 hr patch 1 Patch  1 Patch TransDERmal DAILY PRN      SCHEDULED MEDICATIONS  Current Facility-Administered Medications Medication Dose Route Frequency    docusate sodium (COLACE) capsule 100 mg  100 mg Oral BID    fludrocortisone (FLORINEF) tablet 200 mcg  0.2 mg Oral DAILY    levothyroxine (SYNTHROID) tablet 62.5 mcg  62.5 mcg Oral 6am    sertraline (ZOLOFT) tablet 25 mg  25 mg Oral DAILY                ASSESSMENT & PLAN        The patient, Mirian Juan, is a 30 y.o.  female who presents at this time for treatment of the following diagnoses:  Patient Active Hospital Problem List:   Depressive disorder (2/17/2018)    Assessment: depressed and anxiety     Plan: restart her Zoloft   Need family session with her mother          I will continue to monitor blood levels (Depakote, Tegretol, lithium, clozapine---a drug with a narrow therapeutic index= NTI) and associated labs for drug therapy implemented that require intense monitoring for toxicity as deemed appropriate based on current medication side effects and pharmacodynamically determined drug 1/2 lives. A coordinated, multidisplinary treatment team (includes the nurse, unit pharmcist,  and writer) round was conducted for this initial evaluation with the patient present. The following regarding medications was addressed during rounds with patient:   the risks and benefits of the proposed medication. The patient was given the opportunity to ask questions. Informed consent given to the use of the above medications. I will continue to adjust psychiatric and non-psychiatric medications (see above \"medication\" section and orders section for details) as deemed appropriate & based upon diagnoses and response to treatment. I have reviewed admission (and previous/old) labs and medical tests in the EHR and or transferring hospital documents. I will continue to order blood tests/labs and diagnostic tests as deemed appropriate and review results as they become available (see orders for details).     I have reviewed old psychiatric and medical records available in the EHR. I Will order additional psychiatric records from other institutions to further elucidate the nature of patient's psychopathology and review once available. I will gather additional collateral information from friends, family and o/p treatment team to further elucidate the nature of patient's psychopathology and baselline level of psychiatric functioning.       ESTIMATED LENGTH OF STAY:    3       STRENGTHS:  Exercising self-direction/Resourceful, Access to housing/residential stability and Interpersonal/supportive relationships (family, friends, peers)                                        SIGNED:    Puneet Arriaga MD  2/18/2018 Statement Selected

## 2021-11-04 NOTE — OB PROVIDER H&P - ASSESSMENT
A/P: 30 yo  @39w3d, GBS neg, in active labor  -admit to l&d  -f/u admission labs and prenatal labs  -clear liquid diet  -monitor vitals  -cont efm/toco monitoring  -pain management prn  -obtain prenatal labs from PMD    Dr. Henley and Dr. Gaffney aware A/P: 30 yo  @39w3d, GBS neg, in active labor  -admit to l&d  -f/u admission labs and prenatal labs  -clear liquid diet  -monitor vitals  -cont efm/toco monitoring  -pain management prn    Dr. Henley and Dr. Gaffney aware

## 2021-11-04 NOTE — OB PROVIDER H&P - NSICDXFAMILYHX_GEN_ALL_CORE_FT
FAMILY HISTORY:  Father  Still living? Yes, Estimated age: Age Unknown  DM (diabetes mellitus), Age at diagnosis: Age Unknown    Mother  Still living? Unknown  DM (diabetes mellitus), Age at diagnosis: Age Unknown

## 2021-11-05 VITALS
RESPIRATION RATE: 19 BRPM | SYSTOLIC BLOOD PRESSURE: 122 MMHG | HEART RATE: 69 BPM | DIASTOLIC BLOOD PRESSURE: 60 MMHG | TEMPERATURE: 97 F

## 2021-11-05 LAB
BASOPHILS # BLD AUTO: 0.06 K/UL — SIGNIFICANT CHANGE UP (ref 0–0.2)
BASOPHILS NFR BLD AUTO: 0.4 % — SIGNIFICANT CHANGE UP (ref 0–1)
COVID-19 NUCLEOCAPSID GAM AB INTERP: NEGATIVE — SIGNIFICANT CHANGE UP
COVID-19 NUCLEOCAPSID TOTAL GAM ANTIBODY RESULT: 0.81 INDEX — SIGNIFICANT CHANGE UP
COVID-19 SPIKE DOMAIN AB INTERP: POSITIVE
COVID-19 SPIKE DOMAIN ANTIBODY RESULT: 21 U/ML — HIGH
EOSINOPHIL # BLD AUTO: 0.14 K/UL — SIGNIFICANT CHANGE UP (ref 0–0.7)
EOSINOPHIL NFR BLD AUTO: 0.9 % — SIGNIFICANT CHANGE UP (ref 0–8)
HBV SURFACE AG SERPL QL IA: SIGNIFICANT CHANGE UP
HCT VFR BLD CALC: 31.6 % — LOW (ref 37–47)
HGB BLD-MCNC: 9.8 G/DL — LOW (ref 12–16)
IMM GRANULOCYTES NFR BLD AUTO: 0.9 % — HIGH (ref 0.1–0.3)
LYMPHOCYTES # BLD AUTO: 22.4 % — SIGNIFICANT CHANGE UP (ref 20.5–51.1)
LYMPHOCYTES # BLD AUTO: 3.37 K/UL — SIGNIFICANT CHANGE UP (ref 1.2–3.4)
MCHC RBC-ENTMCNC: 26.2 PG — LOW (ref 27–31)
MCHC RBC-ENTMCNC: 31 G/DL — LOW (ref 32–37)
MCV RBC AUTO: 84.5 FL — SIGNIFICANT CHANGE UP (ref 81–99)
MEV IGG SER-ACNC: 30 AU/ML — SIGNIFICANT CHANGE UP
MEV IGG+IGM SER-IMP: POSITIVE — SIGNIFICANT CHANGE UP
MONOCYTES # BLD AUTO: 1.25 K/UL — HIGH (ref 0.1–0.6)
MONOCYTES NFR BLD AUTO: 8.3 % — SIGNIFICANT CHANGE UP (ref 1.7–9.3)
NEUTROPHILS # BLD AUTO: 10.12 K/UL — HIGH (ref 1.4–6.5)
NEUTROPHILS NFR BLD AUTO: 67.1 % — SIGNIFICANT CHANGE UP (ref 42.2–75.2)
NRBC # BLD: 0 /100 WBCS — SIGNIFICANT CHANGE UP (ref 0–0)
PLATELET # BLD AUTO: 249 K/UL — SIGNIFICANT CHANGE UP (ref 130–400)
RBC # BLD: 3.74 M/UL — LOW (ref 4.2–5.4)
RBC # FLD: 15.7 % — HIGH (ref 11.5–14.5)
RUBV IGG SER-ACNC: 1.8 INDEX — SIGNIFICANT CHANGE UP
RUBV IGG SER-IMP: POSITIVE — SIGNIFICANT CHANGE UP
SARS-COV-2 IGG+IGM SERPL QL IA: 0.81 INDEX — SIGNIFICANT CHANGE UP
SARS-COV-2 IGG+IGM SERPL QL IA: 21 U/ML — HIGH
SARS-COV-2 IGG+IGM SERPL QL IA: NEGATIVE — SIGNIFICANT CHANGE UP
SARS-COV-2 IGG+IGM SERPL QL IA: POSITIVE
WBC # BLD: 15.07 K/UL — HIGH (ref 4.8–10.8)
WBC # FLD AUTO: 15.07 K/UL — HIGH (ref 4.8–10.8)

## 2021-11-05 PROCEDURE — 99238 HOSP IP/OBS DSCHRG MGMT 30/<: CPT

## 2021-11-05 RX ORDER — TOPIRAMATE 25 MG
1 TABLET ORAL
Qty: 0 | Refills: 0 | DISCHARGE

## 2021-11-05 RX ORDER — ACETAMINOPHEN 500 MG
3 TABLET ORAL
Qty: 0 | Refills: 0 | DISCHARGE
Start: 2021-11-05

## 2021-11-05 RX ORDER — IBUPROFEN 200 MG
1 TABLET ORAL
Qty: 0 | Refills: 0 | DISCHARGE
Start: 2021-11-05

## 2021-11-05 RX ORDER — DROSPIRENONE AND ETHINYL ESTRADIOL 0.03MG-3MG
1 KIT ORAL
Qty: 0 | Refills: 0 | DISCHARGE

## 2021-11-05 RX ADMIN — Medication 1 TABLET(S): at 12:00

## 2021-11-05 NOTE — PROGRESS NOTE ADULT - ATTENDING COMMENTS
PPD#1, recovering well, denies issues or complaints. Emergency precautions reinforced, patient endorsed understanding and agreement with plan. Denies further questions or complaints.

## 2021-11-05 NOTE — DISCHARGE NOTE OB - PATIENT PORTAL LINK FT
You can access the FollowMyHealth Patient Portal offered by [facility] by registering at the following website: [email or web address]. By joining [employer]’s FollowMyHealth portal, you will also be able to view your health information using other applications (apps) compatible with our system.

## 2021-11-05 NOTE — DISCHARGE NOTE OB - CRACKED, BLEEDING NIPPLES
Nurse to nurse report given to MELO Borrego at [facility].   
Patient being admitted to [facility] transplant team. Will drive to [place] by private vehicle with wife per MD. Copies of records sent with patient. Attempted to call nurse to nurse report, but transfer center stated they will have to call back with room and nurse report number. Will call report as soon as call is returned.   
Patient returned from CT.   
Patient to CT via wheelchair.   
Per patient's wife, he is supposed to take his anti rejection medications at 0800 every morning. She has the medications with her. Spoke with Dr. Zhong who gave verbal order that it is ok for patient to take home anti rejection meds at this time.    
Statement Selected

## 2021-11-05 NOTE — PROGRESS NOTE ADULT - SUBJECTIVE AND OBJECTIVE BOX
NATALIO ASHBY  31y  Female  CC: Postpartum  PGY2 Note:  Patient seen and examined bedside. No overnight events. Denies heavy vaginal bleeding and reports pain well controlled. Ambulating and voiding without difficulty. Tolerating Regular Diet. Denies dizziness/lightheadedness, SOB, or palpitations. Denies fever, chills, nausea, vomiting. Breastfeeding.     PAST MEDICAL & SURGICAL HISTORY:  Tonsillitis, chronic    Migraine  with headache    S/P tonsillectomy  Left        Physical Exam  Vital Signs Last 24 Hrs  T(C): 36.6 (04 Nov 2021 23:30), Max: 37.2 (04 Nov 2021 11:53)  T(F): 97.8 (04 Nov 2021 23:30), Max: 98.9 (04 Nov 2021 11:53)  HR: 72 (04 Nov 2021 23:30) (72 - 114)  BP: 103/55 (04 Nov 2021 23:30) (96/48 - 126/60)  RR: 18 (04 Nov 2021 23:30) (18 - 18)    Gen: AAOx3, NAD  CV: RRR. No murmors gallops or rubs.  Pulm: CTAB. No wheezes or rales.  Ext: No calf tenderness, no swelling.   Abd: Soft, nontender, nondistended, BS+  Fundus firm, and below umbilicus. Nontender.   Lochia: Minimal, rubra    Labs:                        11.8   17.21 )-----------( 329      ( 04 Nov 2021 05:00 )             37.2        MEDICATIONS  (STANDING):  acetaminophen     Tablet .. 975 milliGRAM(s) Oral <User Schedule>  diphtheria/tetanus/pertussis (acellular) Vaccine (ADAcel) 0.5 milliLiter(s) IntraMuscular once  ibuprofen  Tablet. 600 milliGRAM(s) Oral every 6 hours  influenza   Vaccine 0.5 milliLiter(s) IntraMuscular once  prenatal multivitamin 1 Tablet(s) Oral daily  sodium chloride 0.9% lock flush 3 milliLiter(s) IV Push every 8 hours    MEDICATIONS  (PRN):  benzocaine 20%/menthol 0.5% Spray 1 Spray(s) Topical every 6 hours PRN for Perineal discomfort  dexAMETHasone  Injectable 4 milliGRAM(s) IV Push every 6 hours PRN Nausea  dibucaine 1% Ointment 1 Application(s) Topical every 6 hours PRN Perineal discomfort  diphenhydrAMINE 25 milliGRAM(s) Oral every 6 hours PRN Pruritus  hydrocortisone 1% Cream 1 Application(s) Topical every 6 hours PRN Moderate Pain (4-6)  lanolin Ointment 1 Application(s) Topical every 6 hours PRN nipple soreness  magnesium hydroxide Suspension 30 milliLiter(s) Oral two times a day PRN Constipation  naloxone Injectable 0.1 milliGRAM(s) IV Push every 3 minutes PRN For ANY of the following changes in patient status:  A. RR LESS THAN 10 breaths per minute, B. Oxygen saturation LESS THAN 90%, C. Sedation score of 6  ondansetron Injectable 4 milliGRAM(s) IV Push every 6 hours PRN Nausea  oxyCODONE    IR 5 milliGRAM(s) Oral every 3 hours PRN Moderate to Severe Pain (4-10)  oxyCODONE    IR 5 milliGRAM(s) Oral once PRN Moderate to Severe Pain (4-10)  pramoxine 1%/zinc 5% Cream 1 Application(s) Topical every 4 hours PRN Moderate Pain (4-6)  simethicone 80 milliGRAM(s) Chew every 4 hours PRN Gas  witch hazel Pads 1 Application(s) Topical every 4 hours PRN Perineal discomfort

## 2021-11-05 NOTE — DISCHARGE NOTE OB - CARE PROVIDER_API CALL
Cody Gaffney  OBSTETRICS AND GYNECOLOGY  [address] [place] [id number]  Phone: [phone]  Fax: [phone]  Follow Up Time:

## 2021-11-05 NOTE — PROGRESS NOTE ADULT - ASSESSMENT
32yo now P1, s/p , PPD#1, recovering well   - encourage ambulation  - PO hydration  - Continue regular diet as tolerated  - f/u AM CBC   - routine postpartum care   - monitor vitals/bleeding  - anticipate d/c home  - instructed to follow up in 6 weeks for postpartum check      and  to be made aware

## 2021-11-09 DIAGNOSIS — Z3A.39 39 WEEKS GESTATION OF PREGNANCY: ICD-10-CM

## 2021-11-09 DIAGNOSIS — O32.6XX0 MATERNAL CARE FOR COMPOUND PRESENTATION, NOT APPLICABLE OR UNSPECIFIED: ICD-10-CM

## 2022-05-04 NOTE — OB RN PATIENT PROFILE - NS TRANSFER PATIENT BELONGINGS
Daily Note     Today's date: 2022  Patient name: Delonet Sparks  : 1963  MRN: [id number]  Referring provider: Nette Burkett MD  Dx:   Encounter Diagnosis     ICD-10-CM    1  Calcific tendinitis of both shoulders  M75 31     M75 32                   Subjective: Pt reports she has pain in both of her shoulders  Objective: See treatment diary below      Assessment: Tolerated treatment fairly well  Verbal cues needed t/o exercise to perform correctly  Pt reports some pain end ranges with bilateral shoulder PROM  C/S and bilateral shoulder ROM deficits noted  Patient would benefit from continued PT      Plan: Continue per plan of care        Precautions: None    Re-eval Date: 2022      Manuals  5-         SOR, distraction, C/S PROM 8' 5' 5' 5'         STM Carlos UT/LS             Carlos shoulder PROM  10' 10' 10'                      Neuro Re-Ed   5-2          Cervical retractions  Supine  5"x10 Supine  5"x10 supine 5" x 10         Scapular retractions 5"x10 5" x10 NP          MTP/LTP  NV Red 1x10 ea Red   1 x 10 ea          Carlos TB ER  Yellow  1x10 Yellow  1x10 yellow  1 x10                                   Patient education '            Ther Ex   5-2          UBE    10' alt         Pulley's  X 10ea x10 ea          Carlos UT/LS stretch 20"x3 20" x3 20" x3          Wall slide  Flex/abd  1x10 ea  Carlos 1x10 ea Carlos 1x10  Ea Carlos         Finger ladder  1x3 flex 1x3 ea 1 x 3 ea         Cane flex  Supine x10 Supine   x10 supine x10         Cane abd             Cane ER             Ball roll on wall             S/L ER             Prone T's             Prone Y's                          Ther Activity                                       Gait Training                                       Modalities             MHP  Cerv  5' Cerv 5' Carlos shld   10' None

## 2023-01-26 NOTE — H&P PST ADULT - NS SC CAGE ALCOHOL ANNOYED YOU
RECEIVED PATIENT LYING ON THE BED, NO SIGNS OF DISTRESS NOTED. IV SITE ON RIGHT AC, PATENT 
AND INTACT. ALL SAFETY MEASURES IN PLACE. no

## 2023-08-04 NOTE — OB RN TRIAGE NOTE - HEART RATE (BEATS/MIN)
82 Detail Level: Detailed Quality 226: Preventive Care And Screening: Tobacco Use: Screening And Cessation Intervention: Tobacco Screening not Performed Quality 111:Pneumonia Vaccination Status For Older Adults: Patient received any pneumococcal conjugate or polysaccharide vaccine on or after their 60th birthday and before the end of the measurement period Quality 402: Tobacco Use And Help With Quitting Among Adolescents: Patient screened for tobacco and never smoked

## 2025-01-14 ENCOUNTER — NON-APPOINTMENT (OUTPATIENT)
Age: 35
End: 2025-01-14

## 2025-01-14 DIAGNOSIS — Z80.3 FAMILY HISTORY OF MALIGNANT NEOPLASM OF BREAST: ICD-10-CM

## 2025-01-14 DIAGNOSIS — Z78.9 OTHER SPECIFIED HEALTH STATUS: ICD-10-CM

## 2025-01-14 DIAGNOSIS — Z86.19 PERSONAL HISTORY OF OTHER INFECTIOUS AND PARASITIC DISEASES: ICD-10-CM

## 2025-01-14 DIAGNOSIS — Z87.42 PERSONAL HISTORY OF OTHER DISEASES OF THE FEMALE GENITAL TRACT: ICD-10-CM

## 2025-01-14 DIAGNOSIS — Z83.3 FAMILY HISTORY OF DIABETES MELLITUS: ICD-10-CM

## 2025-01-14 RX ORDER — PRENATAL VIT NO.126/IRON/FOLIC 28MG-0.8MG
28-0.8 TABLET ORAL
Refills: 0 | Status: ACTIVE | COMMUNITY

## 2025-01-14 RX ORDER — NORETHINDRONE 0.35 MG/1
0.35 TABLET ORAL DAILY
Refills: 0 | Status: ACTIVE | COMMUNITY

## 2025-02-23 PROBLEM — Z30.9 CONTRACEPTION MANAGEMENT: Status: ACTIVE | Noted: 2025-02-23

## 2025-05-28 NOTE — OB PROVIDER H&P - NS_TRIAGEMEMBRANE_OBGYN_ALL_OB
[FreeTextEntry1] : 5/28/25 [facility] X-RAY Left Shoulder 3 views: This scan was reviewed and interpreted by Dr. Fernandez, and his findings are- unremarkable  04/09/25 [facility] MRI Left Knee: 1. Moderate to high-grade partial tear of the proximal medial collateral ligament without complete disruption. 2. Subchondral fracture of the posterior lateral femoral condyle with surrounding marrow edema, likely representing a site of direct impaction. 3. Ruptured Baker's cyst. 4. Mild chondromalacia patella. 5. No meniscal tear. Intact cruciate ligaments.  4/9/25 [facility] X-RAY Left Knee 4 views: This scan was reviewed and interpreted by Dr. Fernandez, and his findings are- grade 2 medial OA, patella johnny Intact

## 2025-06-11 ENCOUNTER — APPOINTMENT (OUTPATIENT)
Age: 35
End: 2025-06-11
Payer: COMMERCIAL

## 2025-06-11 VITALS
HEIGHT: 66 IN | HEART RATE: 63 BPM | DIASTOLIC BLOOD PRESSURE: 79 MMHG | SYSTOLIC BLOOD PRESSURE: 124 MMHG | BODY MASS INDEX: 29.73 KG/M2 | WEIGHT: 185 LBS

## 2025-06-11 PROCEDURE — 99213 OFFICE O/P EST LOW 20 MIN: CPT

## 2025-06-11 RX ORDER — NORETHINDRONE 0.35 MG/1
0.35 TABLET ORAL DAILY
Qty: 1 | Refills: 11 | Status: ACTIVE | COMMUNITY
Start: 2025-06-11 | End: 1900-01-01